# Patient Record
Sex: MALE | Race: WHITE | NOT HISPANIC OR LATINO | Employment: FULL TIME | URBAN - METROPOLITAN AREA
[De-identification: names, ages, dates, MRNs, and addresses within clinical notes are randomized per-mention and may not be internally consistent; named-entity substitution may affect disease eponyms.]

---

## 2021-03-02 ENCOUNTER — OFFICE VISIT (OUTPATIENT)
Dept: DERMATOLOGY | Facility: CLINIC | Age: 61
End: 2021-03-02
Payer: COMMERCIAL

## 2021-03-02 VITALS — WEIGHT: 207.8 LBS | HEIGHT: 70 IN | TEMPERATURE: 96.5 F | BODY MASS INDEX: 29.75 KG/M2

## 2021-03-02 DIAGNOSIS — D22.60 MULTIPLE BENIGN MELANOCYTIC NEVI OF UPPER AND LOWER EXTREMITIES AND TRUNK: ICD-10-CM

## 2021-03-02 DIAGNOSIS — D22.5 MULTIPLE BENIGN MELANOCYTIC NEVI OF UPPER AND LOWER EXTREMITIES AND TRUNK: ICD-10-CM

## 2021-03-02 DIAGNOSIS — L73.8 SEBACEOUS HYPERPLASIA OF FACE: ICD-10-CM

## 2021-03-02 DIAGNOSIS — L57.8 ACTINIC SKIN DAMAGE: ICD-10-CM

## 2021-03-02 DIAGNOSIS — D23.9 DERMATOFIBROMA: ICD-10-CM

## 2021-03-02 DIAGNOSIS — D22.70 MULTIPLE BENIGN MELANOCYTIC NEVI OF UPPER AND LOWER EXTREMITIES AND TRUNK: ICD-10-CM

## 2021-03-02 DIAGNOSIS — L82.1 SEBORRHEIC KERATOSIS: Primary | ICD-10-CM

## 2021-03-02 PROCEDURE — 99204 OFFICE O/P NEW MOD 45 MIN: CPT | Performed by: DERMATOLOGY

## 2021-03-02 RX ORDER — FLUTICASONE PROPIONATE 50 MCG
SPRAY, SUSPENSION (ML) NASAL
COMMUNITY
Start: 2021-02-27

## 2021-03-02 RX ORDER — DICYCLOMINE HYDROCHLORIDE 10 MG/1
10 CAPSULE ORAL
COMMUNITY
Start: 2020-03-06 | End: 2021-03-06

## 2021-03-02 RX ORDER — ECHINACEA 400 MG
CAPSULE ORAL
COMMUNITY

## 2021-03-02 RX ORDER — VERAPAMIL HYDROCHLORIDE 240 MG/1
CAPSULE, EXTENDED RELEASE ORAL
COMMUNITY
Start: 2021-02-15

## 2021-03-02 RX ORDER — FEXOFENADINE HCL 180 MG/1
TABLET ORAL
COMMUNITY

## 2021-03-02 NOTE — PROGRESS NOTES
[facility] Note     Patient Name: Celina Rosenberg  Encounter Date: 3/2/2021     Have you been cared for by a [facility] in the last 3 years and, if so, which one? No    · Have you traveled outside of the [address] [place] [address] in the past 3 months or outside of the [facility] area in the last 2 weeks? No     May we call your Preferred Phone number to discuss your specific medical information? Yes     May we leave a detailed message that includes your specific medical information? Yes      Today's Chief Concerns:   Concern #1:  Skin lesions on chest and waist area   Concern #2:  Full body skin check     Past Medical History:  Have you personally ever had or currently have any of the following? · Skin cancer (such as Melanoma, Basal Cell Carcinoma, Squamous Cell Carcinoma? (If Yes, please provide more detail)- No  · Eczema: No  · Psoriasis: No  · HIV/AIDS: No  · Hepatitis B or C: No  · Tuberculosis: No  · Systemic Immunosuppression such as Diabetes, Biologic or Immunotherapy, Chemotherapy, Organ Transplantation, Bone Marrow Transplantation (If YES, please provide more detail): No  · Radiation Treatment (If YES, please provide more detail): No  · Any other major medical conditions/concerns? (If Yes, which types)- YES, Hisotry of hypertension     Social History:     What is/was your primary occupation?       What are your hobbies/past-times? Reading     Family History:  Have any of your "first degree relatives" (parent, brother, sister, or child) had any of the following       · Skin cancer such as Melanoma or Merkel Cell Carcinoma or Pancreatic Cancer? No  · Eczema, Asthma, Hay Fever or Seasonal Allergies: No  · Psoriasis or Psoriatic Arthritis: No  · Do any other medical conditions seem to run in your family? If Yes, what condition and which relatives?   No    Current Medications:         Current Outpatient Medications:     dicyclomine (BENTYL) 10 mg capsule, Take 10 mg by mouth, Disp: , Rfl:     fexofenadine (ALLEGRA) 180 MG tablet, Take by mouth, Disp: , Rfl:     Flaxseed, Linseed, (Flaxseed Oil) 1000 MG CAPS, Take by mouth, Disp: , Rfl:     fluticasone (FLONASE) 50 mcg/act nasal spray, , Disp: , Rfl:     verapamil (VERELAN) 240 MG 24 hr capsule, TAKE 1 CAPSULE BY MOUTH EVERY NIGHT, Disp: , Rfl:       Review of Systems:  Have you recently had or currently have any of the following? If YES, what are you doing for the problem? · Fever, chills or unintended weight loss: No  · Sudden loss or change in your vision: No  · Nausea, vomiting or blood in your stool: No  · Painful or swollen joints: No  · Wheezing or cough: No  · Changing mole or non-healing wound: No  · Nosebleeds: No  · Excessive sweating: No  · Easy or prolonged bleeding? No  · Over the last 2 weeks, how often have you been bothered by the following problems? · Taking little interest or pleasure in doing things: 1 - Not at All  · Feeling down, depressed, or hopeless: 1 - Not at All  · Rapid heartbeat with epinephrine:  No    · FEMALES ONLY:    · Are you pregnant or planning to become pregnant? N/A  · Are you currently or planning to be nursing or breast feeding? N/A    · Any known allergies? Allergies   Allergen Reactions    Shellfish-Derived Products (Food Allergy) Swelling     Hives and severe swelling         Physical Exam:     Was a chaperone (Derm Clinical Assistant) present throughout the entire Physical Exam? Yes     Did the Dermatology Team specifically  the patient on the importance of a Full Skin Exam to be sure that nothing is missed clinically?  Yes}  o Did the patient ultimately request or accept a Full Skin Exam?  Yes  o Did the patient specifically refuse to have the areas "under-the-bra" examined by the Dermatologist? No  o Did the patient specifically refuse to have the areas "under-the-underwear" examined by the Dermatologist? No    CONSTITUTIONAL:   Vitals:    03/02/21 1052   Temp: (!) 96 5 °F (35 8 °C)   TempSrc: Tympanic   Weight: 94 3 kg (207 lb 12 8 oz)   Height: 5' 10" (1 778 m)       PSYCH: Normal mood and affect  EYES: Normal conjunctiva  ENT: Normal lips and oral mucosa  CARDIOVASCULAR: No edema  RESPIRATORY: Normal respirations  HEME/LYMPH/IMMUNO:  No regional lymphadenopathy except as noted below in "ASSESSMENT AND PLAN BY DIAGNOSIS"    SKIN:  FULL ORGAN SYSTEM EXAM   Hair, Scalp, Ears, Face Normal except as noted below in Assessment   Neck, Cervical Chain Nodes Normal except as noted below in Assessment   Right Arm/Hand/Fingers Normal except as noted below in Assessment   Left Arm/Hand/Fingers Normal except as noted below in Assessment   Chest/Breasts/Axillae Viewed areas Normal except as noted below in Assessment   Abdomen, Umbilicus Normal except as noted below in Assessment   Back/Spine Normal except as noted below in Assessment   Groin/Genitalia/Buttocks    Right Leg, Foot, Toes Normal except as noted below in Assessment   Left Leg, Foot, Toes Normal except as noted below in Assessment        Assessment and Plan by Diagnosis:    History of Present Condition:     Duration:  How long has this been an issue for you?    o  A year    Location Affected:  Where on the body is this affecting you?    o  Chest and waist area   Quality:  Is there any bleeding, pain, itch, burning/irritation, or redness associated with the skin lesion?    o  Denies   Severity:  Describe any bleeding, pain, itch, burning/irritation, or redness on a scale of 1 to 10 (with 10 being the worst)  o  denies   Timing:  Does this condition seem to be there pretty constantly or do you notice it more at specific times throughout the day?    o  Denies    Context:  Have you ever noticed that this condition seems to be associated with specific activities you do?    o  Denies    Modifying Factors:    o Anything that seems to make the condition worse?     - Denies  o What have you tried to do to make the condition better? -  Denies   Associated Signs and Symptoms:  Does this skin lesion seem to be associated with any of the following:  o  Denies     1  SEBORRHEIC KERATOSIS; NON-INFLAMED    Physical Exam:   Anatomic Location Affected:  Chest, trunk    Morphological Description:  Flat and raised, waxy, smooth to warty textured, yellow to brownish-grey to dark brown to blackish, discrete, "stuck-on" appearing papules   Pertinent Positives:   Pertinent Negatives: Additional History of Present Condition:  Patient reports new bumps on the skin  Denies itch, burn, pain, bleeding or ulceration  Present constantly; nothing seems to make it worse or better  No prior treatment  Assessment and Plan:  Based on a thorough discussion of this condition and the management approach to it (including a comprehensive discussion of the known risks, side effects and potential benefits of treatment), the patient (family) agrees to implement the following specific plan:   Reassured benign   When outside we recommend using a wide brim hat, sunglasses, long sleeve and pants, sunscreen with SPF 58+ with reapplication every 2 hours, or SPF specific clothing      Seborrheic Keratosis  A seborrheic keratosis is a harmless warty spot that appears during adult life as a common sign of skin aging  Seborrheic keratoses can arise on any area of skin, covered or uncovered, with the usual exception of the palms and soles  They do not arise from mucous membranes  Seborrheic keratoses can have highly variable appearance  Seborrheic keratoses are extremely common  It has been estimated that over 90% of adults over the age of 61 years have one or more of them  They occur in males and females of all races, typically beginning to erupt in the 35s or 45s  They are uncommon under the age of 21 years  The precise cause of seborrhoeic keratoses is not known    Seborrhoeic keratoses are considered degenerative in nature  As time goes by, seborrheic keratoses tend to become more numerous  Some people inherit a tendency to develop a very large number of them; some people may have hundreds of them  The name "seborrheic keratosis" is misleading, because these lesions are not limited to a seborrhoeic distribution (scalp, mid-face, chest, upper back), nor are they formed from sebaceous glands, nor are they associated with sebum -- which is greasy  Seborrheic keratosis may also be called "SK," "Seb K," "basal cell papilloma," "senile wart," or "barnacle "      Researchers have noted:   Eruptive seborrhoeic keratoses can follow sunburn or dermatitis   Skin friction may be the reason they appear in body folds   Viral cause (e g , human papillomavirus) seems unlikely   Stable and clonal mutations or activation of FRFR3, PIK3CA, EDDY, AKT1 and EGFR genes are found in seborrhoeic keratoses   Seborrhoeic keratosis can arise from solar lentigo   FRFR3 mutations also arise in solar lentigines  These mutations are associated with increased age and location on the head and neck, suggesting a role of ultraviolet radiation in these lesions   Seborrheic keratoses do not harbour tumour suppressor gene mutations   Epidermal growth factor receptor inhibitors, which are used to treat some cancers, often result in an increase in verrucal (warty) keratoses  There is no easy way to remove multiple lesions on a single occasion  Unless a specific lesion is "inflamed" and is causing pain or stinging/burning or is bleeding, most insurance companies do not authorize treatment        2  DERMATOFIBROMA    Physical Exam:   Anatomic Location Affected:  Left thigh    Morphological Description:  Firm dermal nodule 0 6cm   Pertinent Positives:   Pertinent Negatives:      Assessment and Plan:  Based on a thorough discussion of this condition and the management approach to it (including a comprehensive discussion of the known risks, side effects and potential benefits of treatment), the patient (family) agrees to implement the following specific plan:   Reassured benign     Assessment and Plan:  A dermatofibroma is a common benign fibrous nodule that most often arises on the skin of the lower legs  A dermatofibroma is also called a "cutaneous fibrous histiocytoma "  Dermatofibromas occur at all ages and in people of every ethnicity  They are more common in women than in men  It is not clear if dermatofibroma is a reactive process or if it is a neoplasm  The lesions are made up of proliferating fibroblasts  Histiocytes may also be involved  They are sometimes attributed to an insect bite or ingrownhair or local trauma, but not consistently  They may be more numerous in patients with altered immunity  Dermatofibromas most often occur on the legs and arms, but may also arise on the trunk or any site of the body  Typical clinical features include the following:   People may have 1 or up to 15 lesions   Size varies from 0 5-1 5 cm diameter; most lesions are 7-10 mm diameter   They are firm nodules tethered to the skin surface and mobile over subcutaneous tissue   The skin "dimples" on pinching the lesion   Color may be pink to light brown in white skin, and dark brown to black in dark skin; some appear paler in the center   They do not usually cause symptoms, but they are sometimes painful or itchy   Because they are often raised lesions, they may be traumatized, for example by a razor   Occasionally dozens may erupt within a few months, usually in the setting of immunosuppression (for example autoimmune disease, cancer or certain medications)   Dermatofibroma does not give rise to cancer  However, occasionally, it may be mistaken for dermatofibrosarcoma or desmoplastic melanoma  A dermatofibroma is harmless and seldom causes any symptoms  Usually, only reassurance is needed   If it is nuisance or causing concern, the lesion can be removed surgically, resulting in a scar that is, by definition, usually longer in diameter than the widest portion of the dermatofibroma  Cryotherapy, shave biopsy and laser surgery are rarely completely successful  Skin punch biopsy or incisional biopsy may be undertaken if there is an atypical feature such as recent enlargement, ulceration, or asymmetrical structures and colours on dermatoscopy  3  SEBACEOUS HYPERPLASIA    Physical Exam:   Anatomic Location Affected:  Face, cheek    Morphological Description:  Yellow umbilicated patpules   Pertinent Positives:   Pertinent Negatives:      Assessment and Plan:  Based on a thorough discussion of this condition and the management approach to it (including a comprehensive discussion of the known risks, side effects and potential benefits of treatment), the patient (family) agrees to implement the following specific plan:   Reassured benign    Recommended to apply a cream with Retinol over the counter   Discussed laser or chemical peels     Sebaceous Hyperplasia  Sebaceous hyperplasia is a common, benign condition of enlarged oil secreting (sebaceous) glands commonly found on the forehead and cheeks of middle-aged and elderly patients  They normally appear as small yellow bumps up to 3mm in diameter that can be single or multiple  The bumps on the face often display a centrall dell  Occasionally, these bumps can occur on the chest, areola, mouth, and genitals  Rarely, they can grow to take a giant form, or be arranged linearly  Causes of sebaceous hyperplasia  Sebaceous hyperplasic is a form of benign hair follicle tumor and can often be confused with basal cell carcinoma  It can be more prevalent in immunosuppressed patients such as those undergoing organ transplantation  In the rare Acosta-Reinbeck syndrome, sebaceous hyperplasia occurs in association with internal cancers    Lesions of sebaceous hyperplasia are benign, with no known potential for malignant transformation, but they may be associated with nonmelanoma skin cancer in transplantation patients  How we do diagnose sebaceous hyperplasia? Your dermatologist may take a closer look at the bumps with a device called a dermatoscope  Common features include a central hair follicle surrounded by yellowish lobules with prominent blood vessels  What is the treatment of sebaceous hyperplasia? Since sebaceous hyperplasia is benign with no known potential for transformation into cancer, treatment is mostly for cosmetic reasons or if the lesions become irritated  Options include   - Light electrocautery or laser vaporization  - Oral isotrentinoin is effective for extensive or disfiguring spots, but do not prevent recurrence   - Antiandrogens may be used in females to decrease the size and improve overall appearance of bumps     4  ACTINIC DAMAGE (Chronic Ultraviolet Radiation Exposure)    Physical Exam:   Anatomic Location Affected: Face   Morphological Description:  Mottled (hyper- and hypo-pigmented), slightly atrophic skin with overlying telangiectasia   Pertinent Positives:   Pertinent Negatives:      Assessment and Plan:  Based on a thorough discussion of this condition and the management approach to it (including a comprehensive discussion of the known risks, side effects and potential benefits of treatment), the patient (family) agrees to implement the following specific plan:   Reassured benign   When outside we recommend using a wide brim hat, sunglasses, long sleeve and pants, sunscreen with SPF 84+ with reapplication every 2 hours, or SPF specific clothing       Photo-aging and actinic damage of skin is common on sites repeatedly exposed to the sun, especially the backs of the hands and the face, most often affecting the ears, nose, cheeks, upper lip, vermilion of the lower lip, temples, forehead and balding scalp   In severely chronically sun-exposed individuals, this condition may also be found on the upper trunk, upper and lower limbs, and dorsum of feet  Photo-aging induces cutaneous changes that vary among individuals, reflecting inherent differences in vulnerability to sun exposure and repair capacity  We discussed further steps to minimize or avoid UV exposure:     Be aware of daily UV index levels  In the [facility], this index is often reported on the [address]   Avoid outdoor activities during the middle of the day   Wear sun-protective clothing (e g , UPF-rated, broad-brimmed hats, long sleeves, and trousers or skirts)   Apply a high sun protection factor (60+) broad-spectrum sunscreen moisturizer at least three times a day to affected areas, year-round  I recommended Neutrogena Daily Defense or CeraVe AM or Aveeno   Do not smoke, and where possible, avoid exposure to pollutants   Get plenty of exercise -- active people appear younger than inactive people   Eat fruit and vegetables daily   Many oral supplements with antioxidant and anti-inflammatory properties have been advocated to mitigate skin aging and to improve skin health  These include carotenoids; polyphenols; chlorophyll; aloe vera; vitamins B, C, and E; red ginseng; squalene; and omega-3 fatty acids  Their role in combatting skin aging is unclear  MELANOCYTIC NEVI ("Moles")    Physical Exam:   Anatomic Location Affected:   Trunk and extremities    Morphological Description:  Scattered, 1-4mm round to ovoid, symmetrical-appearing, even bordered, skin colored to dark brown macules/papules, mostly in sun-exposed areas   Pertinent Positives:   Pertinent Negatives: Additional History of Present Condition:  No historical changes noted      Assessment and Plan:  Based on a thorough discussion of this condition and the management approach to it (including a comprehensive discussion of the known risks, side effects and potential benefits of treatment), the patient (family) agrees to implement the following specific plan:   Reassured benign   When outside we recommend using a wide brim hat, sunglasses, long sleeve and pants, sunscreen with SPF 90+ with reapplication every 2 hours, or SPF specific clothing   Monitor for changes  Melanocytic Nevi  Melanocytic nevi ("moles") are tan or brown, raised or flat areas of the skin which have an increased number of melanocytes  Melanocytes are the cells in our body which make pigment and account for skin color  Some moles are present at birth (I e , "congenital nevi"), while others come up later in life (i e , "acquired nevi")  The sun can stimulate the body to make more moles  Sunburns are not the only thing that triggers more moles  Chronic sun exposure can do it too  Clinically distinguishing a healthy mole from melanoma may be difficult, even for experienced dermatologists  The "ABCDE's" of moles have been suggested as a means of helping to alert a person to a suspicious mole and the possible increased risk of melanoma  The suggestions for raising alert are as follows:    Asymmetry: Healthy moles tend to be symmetric, while melanomas are often asymmetric  Asymmetry means if you draw a line through the mole, the two halves do not match in color, size, shape, or surface texture  Asymmetry can be a result of rapid enlargement of a mole, the development of a raised area on a previously flat lesion, scaling, ulceration, bleeding or scabbing within the mole  Any mole that starts to demonstrate "asymmetry" should be examined promptly by a board certified dermatologist      Border: Healthy moles tend to have discrete, even borders  The border of a melanoma often blends into the normal skin and does not sharply delineate the mole from normal skin    Any mole that starts to demonstrate "uneven borders" should be examined promptly by a board certified dermatologist      Color: Healthy moles tend to be one color throughout  Melanomas tend to be made up of different colors ranging from dark black, blue, white, or red  Any mole that demonstrates a color change should be examined promptly by a board certified dermatologist      Diameter: Healthy moles tend to be smaller than 0 6 cm in size; an exception are "congenital nevi" that can be larger  Melanomas tend to grow and can often be greater than 0 6 cm (1/4 of an inch, or the size of a pencil eraser)  This is only a guideline, and many normal moles may be larger than 0 6 cm without being unhealthy  Any mole that starts to change in size (small to bigger or bigger to smaller) should be examined promptly by a board certified dermatologist      Evolving: Healthy moles tend to "stay the same "  Melanomas may often show signs of change or evolution such as a change in size, shape, color, or elevation  Any mole that starts to itch, bleed, crust, burn, hurt, or ulcerate or demonstrate a change or evolution should be examined promptly by a board certified dermatologist       Dysplastic Nevi  Dysplastic moles are moles that fit the ABCDE rules of melanoma but are not identified as melanomas when examined under the microscope  They may indicate an increased risk of melanoma in that person  If there is a family history of melanoma, most experts agree that the person may be at an increased risk for developing a melanoma  Experts still do not agree on what dysplastic moles mean in patients without a personal or family history of melanoma  Dysplastic moles are usually larger than common moles and have different colors within it with irregular borders  The appearance can be very similar to a melanoma  Biopsies of dysplastic moles may show abnormalities which are different from a regular mole  Melanoma  Malignant melanoma is a type of skin cancer that can be deadly if it spreads throughout the body   The incidence of melanoma in the [place] is growing faster than any other cancer  Melanoma usually grows near the surface of the skin for a period of time, and then begins to grow deeper into the skin  Once it grows deeper into the skin, the risk of spread to other organs greatly increases  Therefore, early detection and removal of a malignant melanoma may result in a better chance at a complete cure; removal after the tumor has spread may not be as effective, leading to worse clinical outcomes such as death  The true rate of nevus transformation into a melanoma is unknown  It has been estimated that the lifetime risk for any acquired melanocytic nevus on any 21year-old individual transforming into melanoma by age [de-identified] is 0 03% (1 in 3,164) for men and 0 009% (1 in 10,800) for women  The appearance of a "new mole" remains one of the most reliable methods for identifying a malignant melanoma  Occasionally, melanomas appear as rapidly growing, blue-black, dome-shaped bumps within a previous mole or previous area of normal skin  Other times, melanomas are suspected when a mole suddenly appears or changes  Itching, burning, or pain in a pigmented lesion should increase suspicion, but most patients with early melanoma have no skin discomfort whatsoever  Melanoma can occur anywhere on the skin, including areas that are difficult for self-examination  Many melanomas are first noticed by other family members  Suspicious-looking moles may be removed for microscopic examination  You may be able to prevent death from melanoma by doing two simple things:    1  Try to avoid unnecessary sun exposure and protect your skin when it is exposed to the sun  People who live near the equator, people who have intermittent exposures to large amounts of sun, and people who have had sunburns in childhood or adolescence have an increased risk for melanoma  Sun sense and vigilant sun protection may be keys to helping to prevent melanoma    We recommend wearing UPF-rated sun protective clothing and sunglasses whenever possible and applying a moisturizer-sunscreen combination product (SPF 50+) such as Neutrogena Daily Defense to sun exposed areas of skin at least three times a day  2  Have your moles regularly examined by a board certified dermatologist AND by yourself or a family member/friend at home  We recommend that you have your moles examined at least once a year by a board certified dermatologist   Use your birthday as an annual reminder to have your "Birthday Suit" (I e , your skin) examined; it is a nice birthday gift to yourself to know that your skin is healthy appearing! Additionally, at-home self examinations may be helpful for detecting a possible melanoma  Use the ABCDEs we discussed and check your moles once a month at home        Scribe Attestation    I,:  Leticia Karimi am acting as a scribe while in the presence of the attending physician :       I,:  Katerin Dasilva MD personally performed the services described in this documentation    as scribed in my presence :

## 2021-03-02 NOTE — PATIENT INSTRUCTIONS
SEBORRHEIC KERATOSIS; NON-INFLAMED    Assessment and Plan:  Based on a thorough discussion of this condition and the management approach to it (including a comprehensive discussion of the known risks, side effects and potential benefits of treatment), the patient (family) agrees to implement the following specific plan:   Reassured benign   When outside we recommend using a wide brim hat, sunglasses, long sleeve and pants, sunscreen with SPF 66+ with reapplication every 2 hours, or SPF specific clothing      Seborrheic Keratosis  A seborrheic keratosis is a harmless warty spot that appears during adult life as a common sign of skin aging  Seborrheic keratoses can arise on any area of skin, covered or uncovered, with the usual exception of the palms and soles  They do not arise from mucous membranes  Seborrheic keratoses can have highly variable appearance  Seborrheic keratoses are extremely common  It has been estimated that over 90% of adults over the age of 61 years have one or more of them  They occur in males and females of all races, typically beginning to erupt in the 35s or 45s  They are uncommon under the age of 21 years  The precise cause of seborrhoeic keratoses is not known  Seborrhoeic keratoses are considered degenerative in nature  As time goes by, seborrheic keratoses tend to become more numerous  Some people inherit a tendency to develop a very large number of them; some people may have hundreds of them  The name "seborrheic keratosis" is misleading, because these lesions are not limited to a seborrhoeic distribution (scalp, mid-face, chest, upper back), nor are they formed from sebaceous glands, nor are they associated with sebum -- which is greasy    Seborrheic keratosis may also be called "SK," "Seb K," "basal cell papilloma," "senile wart," or "barnacle "      Researchers have noted:   Eruptive seborrhoeic keratoses can follow sunburn or dermatitis   Skin friction may be the reason they appear in body folds   Viral cause (e g , human papillomavirus) seems unlikely   Stable and clonal mutations or activation of FRFR3, PIK3CA, EDDY, AKT1 and EGFR genes are found in seborrhoeic keratoses   Seborrhoeic keratosis can arise from solar lentigo   FRFR3 mutations also arise in solar lentigines  These mutations are associated with increased age and location on the head and neck, suggesting a role of ultraviolet radiation in these lesions   Seborrheic keratoses do not harbour tumour suppressor gene mutations   Epidermal growth factor receptor inhibitors, which are used to treat some cancers, often result in an increase in verrucal (warty) keratoses  There is no easy way to remove multiple lesions on a single occasion  Unless a specific lesion is "inflamed" and is causing pain or stinging/burning or is bleeding, most insurance companies do not authorize treatment  2  DERMATOFIBROMA      Assessment and Plan:  Based on a thorough discussion of this condition and the management approach to it (including a comprehensive discussion of the known risks, side effects and potential benefits of treatment), the patient (family) agrees to implement the following specific plan:   Reassured benign     Assessment and Plan:  A dermatofibroma is a common benign fibrous nodule that most often arises on the skin of the lower legs  A dermatofibroma is also called a "cutaneous fibrous histiocytoma "  Dermatofibromas occur at all ages and in people of every ethnicity  They are more common in women than in men  It is not clear if dermatofibroma is a reactive process or if it is a neoplasm  The lesions are made up of proliferating fibroblasts  Histiocytes may also be involved  They are sometimes attributed to an insect bite or ingrownhair or local trauma, but not consistently  They may be more numerous in patients with altered immunity      Dermatofibromas most often occur on the legs and arms, but may also arise on the trunk or any site of the body  Typical clinical features include the following:   People may have 1 or up to 15 lesions   Size varies from 0 5-1 5 cm diameter; most lesions are 7-10 mm diameter   They are firm nodules tethered to the skin surface and mobile over subcutaneous tissue   The skin "dimples" on pinching the lesion   Color may be pink to light brown in white skin, and dark brown to black in dark skin; some appear paler in the center   They do not usually cause symptoms, but they are sometimes painful or itchy   Because they are often raised lesions, they may be traumatized, for example by a razor   Occasionally dozens may erupt within a few months, usually in the setting of immunosuppression (for example autoimmune disease, cancer or certain medications)   Dermatofibroma does not give rise to cancer  However, occasionally, it may be mistaken for dermatofibrosarcoma or desmoplastic melanoma  A dermatofibroma is harmless and seldom causes any symptoms  Usually, only reassurance is needed  If it is nuisance or causing concern, the lesion can be removed surgically, resulting in a scar that is, by definition, usually longer in diameter than the widest portion of the dermatofibroma  Cryotherapy, shave biopsy and laser surgery are rarely completely successful  Skin punch biopsy or incisional biopsy may be undertaken if there is an atypical feature such as recent enlargement, ulceration, or asymmetrical structures and colours on dermatoscopy  3  SEBACEOUS HYPERPLASIA      Assessment and Plan:  Based on a thorough discussion of this condition and the management approach to it (including a comprehensive discussion of the known risks, side effects and potential benefits of treatment), the patient (family) agrees to implement the following specific plan:   Reassured benign    Recommended to apply a cream with Retinol over the counter     Discussed laser or chemical peels Sebaceous Hyperplasia  Sebaceous hyperplasia is a common, benign condition of enlarged oil secreting (sebaceous) glands commonly found on the forehead and cheeks of middle-aged and elderly patients  They normally appear as small yellow bumps up to 3mm in diameter that can be single or multiple  The bumps on the face often display a centrall dell  Occasionally, these bumps can occur on the chest, areola, mouth, and genitals  Rarely, they can grow to take a giant form, or be arranged linearly  Causes of sebaceous hyperplasia  Sebaceous hyperplasic is a form of benign hair follicle tumor and can often be confused with basal cell carcinoma  It can be more prevalent in immunosuppressed patients such as those undergoing organ transplantation  In the rare Acosta-Milton syndrome, sebaceous hyperplasia occurs in association with internal cancers  Lesions of sebaceous hyperplasia are benign, with no known potential for malignant transformation, but they may be associated with nonmelanoma skin cancer in transplantation patients  How we do diagnose sebaceous hyperplasia? Your dermatologist may take a closer look at the bumps with a device called a dermatoscope  Common features include a central hair follicle surrounded by yellowish lobules with prominent blood vessels  What is the treatment of sebaceous hyperplasia? Since sebaceous hyperplasia is benign with no known potential for transformation into cancer, treatment is mostly for cosmetic reasons or if the lesions become irritated  Options include   - Light electrocautery or laser vaporization  - Oral isotrentinoin is effective for extensive or disfiguring spots, but do not prevent recurrence   - Antiandrogens may be used in females to decrease the size and improve overall appearance of bumps     4   ACTINIC DAMAGE (Chronic Ultraviolet Radiation Exposure)      Assessment and Plan:  Based on a thorough discussion of this condition and the management approach to it (including a comprehensive discussion of the known risks, side effects and potential benefits of treatment), the patient (family) agrees to implement the following specific plan:   Reassured benign   When outside we recommend using a wide brim hat, sunglasses, long sleeve and pants, sunscreen with SPF 10+ with reapplication every 2 hours, or SPF specific clothing       Photo-aging and actinic damage of skin is common on sites repeatedly exposed to the sun, especially the backs of the hands and the face, most often affecting the ears, nose, cheeks, upper lip, vermilion of the lower lip, temples, forehead and balding scalp  In severely chronically sun-exposed individuals, this condition may also be found on the upper trunk, upper and lower limbs, and dorsum of feet  Photo-aging induces cutaneous changes that vary among individuals, reflecting inherent differences in vulnerability to sun exposure and repair capacity  We discussed further steps to minimize or avoid UV exposure:     Be aware of daily UV index levels  In the [facility], this index is often reported on the [address]   Avoid outdoor activities during the middle of the day   Wear sun-protective clothing (e g , UPF-rated, broad-brimmed hats, long sleeves, and trousers or skirts)   Apply a high sun protection factor (60+) broad-spectrum sunscreen moisturizer at least three times a day to affected areas, year-round  I recommended Neutrogena Daily Defense or CeraVe AM or Aveeno   Do not smoke, and where possible, avoid exposure to pollutants   Get plenty of exercise -- active people appear younger than inactive people   Eat fruit and vegetables daily   Many oral supplements with antioxidant and anti-inflammatory properties have been advocated to mitigate skin aging and to improve skin health   These include carotenoids; polyphenols; chlorophyll; aloe vera; vitamins B, C, and E; red ginseng; squalene; and omega-3 fatty acids  Their role in combatting skin aging is unclear  MELANOCYTIC NEVI ("Moles")    Assessment and Plan:  Based on a thorough discussion of this condition and the management approach to it (including a comprehensive discussion of the known risks, side effects and potential benefits of treatment), the patient (family) agrees to implement the following specific plan:   Reassured benign   When outside we recommend using a wide brim hat, sunglasses, long sleeve and pants, sunscreen with SPF 09+ with reapplication every 2 hours, or SPF specific clothing   Monitor for changes       Melanocytic Nevi  Melanocytic nevi ("moles") are tan or brown, raised or flat areas of the skin which have an increased number of melanocytes  Melanocytes are the cells in our body which make pigment and account for skin color  Some moles are present at birth (I e , "congenital nevi"), while others come up later in life (i e , "acquired nevi")  The sun can stimulate the body to make more moles  Sunburns are not the only thing that triggers more moles  Chronic sun exposure can do it too  Clinically distinguishing a healthy mole from melanoma may be difficult, even for experienced dermatologists  The "ABCDE's" of moles have been suggested as a means of helping to alert a person to a suspicious mole and the possible increased risk of melanoma  The suggestions for raising alert are as follows:    Asymmetry: Healthy moles tend to be symmetric, while melanomas are often asymmetric  Asymmetry means if you draw a line through the mole, the two halves do not match in color, size, shape, or surface texture  Asymmetry can be a result of rapid enlargement of a mole, the development of a raised area on a previously flat lesion, scaling, ulceration, bleeding or scabbing within the mole    Any mole that starts to demonstrate "asymmetry" should be examined promptly by a board certified dermatologist      Border: Healthy moles tend to have discrete, even borders  The border of a melanoma often blends into the normal skin and does not sharply delineate the mole from normal skin  Any mole that starts to demonstrate "uneven borders" should be examined promptly by a board certified dermatologist      Color: Healthy moles tend to be one color throughout  Melanomas tend to be made up of different colors ranging from dark black, blue, white, or red  Any mole that demonstrates a color change should be examined promptly by a board certified dermatologist      Diameter: Healthy moles tend to be smaller than 0 6 cm in size; an exception are "congenital nevi" that can be larger  Melanomas tend to grow and can often be greater than 0 6 cm (1/4 of an inch, or the size of a pencil eraser)  This is only a guideline, and many normal moles may be larger than 0 6 cm without being unhealthy  Any mole that starts to change in size (small to bigger or bigger to smaller) should be examined promptly by a board certified dermatologist      Evolving: Healthy moles tend to "stay the same "  Melanomas may often show signs of change or evolution such as a change in size, shape, color, or elevation  Any mole that starts to itch, bleed, crust, burn, hurt, or ulcerate or demonstrate a change or evolution should be examined promptly by a board certified dermatologist       Dysplastic Nevi  Dysplastic moles are moles that fit the ABCDE rules of melanoma but are not identified as melanomas when examined under the microscope  They may indicate an increased risk of melanoma in that person  If there is a family history of melanoma, most experts agree that the person may be at an increased risk for developing a melanoma  Experts still do not agree on what dysplastic moles mean in patients without a personal or family history of melanoma  Dysplastic moles are usually larger than common moles and have different colors within it with irregular borders   The appearance can be very similar to a melanoma  Biopsies of dysplastic moles may show abnormalities which are different from a regular mole  Melanoma  Malignant melanoma is a type of skin cancer that can be deadly if it spreads throughout the body  The incidence of melanoma in the [place] is growing faster than any other cancer  Melanoma usually grows near the surface of the skin for a period of time, and then begins to grow deeper into the skin  Once it grows deeper into the skin, the risk of spread to other organs greatly increases  Therefore, early detection and removal of a malignant melanoma may result in a better chance at a complete cure; removal after the tumor has spread may not be as effective, leading to worse clinical outcomes such as death  The true rate of nevus transformation into a melanoma is unknown  It has been estimated that the lifetime risk for any acquired melanocytic nevus on any 21year-old individual transforming into melanoma by age [de-identified] is 0 03% (1 in 3,164) for men and 0 009% (1 in 10,800) for women  The appearance of a "new mole" remains one of the most reliable methods for identifying a malignant melanoma  Occasionally, melanomas appear as rapidly growing, blue-black, dome-shaped bumps within a previous mole or previous area of normal skin  Other times, melanomas are suspected when a mole suddenly appears or changes  Itching, burning, or pain in a pigmented lesion should increase suspicion, but most patients with early melanoma have no skin discomfort whatsoever  Melanoma can occur anywhere on the skin, including areas that are difficult for self-examination  Many melanomas are first noticed by other family members  Suspicious-looking moles may be removed for microscopic examination  You may be able to prevent death from melanoma by doing two simple things:    1  Try to avoid unnecessary sun exposure and protect your skin when it is exposed to the sun    People who live near the equator, people who have intermittent exposures to large amounts of sun, and people who have had sunburns in childhood or adolescence have an increased risk for melanoma  Sun sense and vigilant sun protection may be keys to helping to prevent melanoma  We recommend wearing UPF-rated sun protective clothing and sunglasses whenever possible and applying a moisturizer-sunscreen combination product (SPF 50+) such as Neutrogena Daily Defense to sun exposed areas of skin at least three times a day  2  Have your moles regularly examined by a board certified dermatologist AND by yourself or a family member/friend at home  We recommend that you have your moles examined at least once a year by a board certified dermatologist   Use your birthday as an annual reminder to have your "Birthday Suit" (I e , your skin) examined; it is a nice birthday gift to yourself to know that your skin is healthy appearing! Additionally, at-home self examinations may be helpful for detecting a possible melanoma  Use the ABCDEs we discussed and check your moles once a month at home

## 2022-12-21 ENCOUNTER — OFFICE VISIT (OUTPATIENT)
Dept: DERMATOLOGY | Facility: CLINIC | Age: 62
End: 2022-12-21

## 2022-12-21 VITALS — HEIGHT: 70 IN | WEIGHT: 223 LBS | BODY MASS INDEX: 31.92 KG/M2

## 2022-12-21 DIAGNOSIS — D48.5 NEOPLASM OF UNCERTAIN BEHAVIOR OF SKIN: ICD-10-CM

## 2022-12-21 DIAGNOSIS — L82.1 SEBORRHEIC KERATOSIS: Primary | ICD-10-CM

## 2022-12-21 RX ORDER — TAMSULOSIN HYDROCHLORIDE 0.4 MG/1
0.4 CAPSULE ORAL DAILY
COMMUNITY
Start: 2022-12-14

## 2022-12-21 NOTE — PROGRESS NOTES
[facility] [facility] [address] [facility] Note     Patient Name: Alejandro Kasper  Encounter Date: 12/21/22     Have you been cared for by a [place] [address] Dermatologist in the last 3 years and, if so, which description applies to you? Yes  I have been here within the last 3 years, and my medical history has NOT changed since that time  I am MALE/not capable of bearing children  REVIEW OF SYSTEMS:  Have you recently had or currently have any of the following? · No changes in my recent health  PAST MEDICAL HISTORY:  Have you personally ever had or currently have any of the following? If "YES," then please provide more detail  · No changes in my medical history  FAMILY HISTORY:  Any "first degree relatives" (parent, brother, sister, or child) with the following? • No changes in my family's known health  PATIENT EXPERIENCE:    • Do you want the Dermatologist to perform a COMPLETE skin exam today including a clinical examination under the "bra and underwear" areas? Yes  • If necessary, do we have your permission to call and leave a detailed message on your Preferred Phone number that includes your specific medical information?   Yes      Allergies   Allergen Reactions   • Shellfish-Derived Products - Food Allergy Swelling     Hives and severe swelling      Current Outpatient Medications:   •  dicyclomine (BENTYL) 10 mg capsule, Take 10 mg by mouth, Disp: , Rfl:   •  fexofenadine (ALLEGRA) 180 MG tablet, Take by mouth, Disp: , Rfl:   •  Flaxseed, Linseed, (Flaxseed Oil) 1000 MG CAPS, Take by mouth, Disp: , Rfl:   •  fluticasone (FLONASE) 50 mcg/act nasal spray, , Disp: , Rfl:   •  tamsulosin (FLOMAX) 0 4 mg, Take 0 4 mg by mouth daily, Disp: , Rfl:   •  verapamil (VERELAN) 240 MG 24 hr capsule, TAKE 1 CAPSULE BY MOUTH EVERY NIGHT, Disp: , Rfl:           • Whom besides the patient is providing clinical information about today's encounter?   o NO ADDITIONAL HISTORIAN (patient alone provided history)    Physical Exam and Assessment/Plan by Diagnosis:    SEBORRHEIC KERATOSIS; NON-INFLAMED    Physical Exam:  • Anatomic Location Affected:  Trunk and extremities   • Morphological Description:  Flat and raised, waxy, smooth to warty textured, yellow to brownish-grey to dark brown to blackish, discrete, "stuck-on" appearing papules  • Pertinent Positives:  • Pertinent Negatives: Additional History of Present Condition:  Patient reports new bumps on the skin  Denies itch, burn, pain, bleeding or ulceration  Present constantly; nothing seems to make it worse or better  No prior treatment  Assessment and Plan:  Based on a thorough discussion of this condition and the management approach to it (including a comprehensive discussion of the known risks, side effects and potential benefits of treatment), the patient (family) agrees to implement the following specific plan:  • Reassured benign   • Monitor for any changes     Seborrheic Keratosis  A seborrheic keratosis is a harmless warty spot that appears during adult life as a common sign of skin aging  Seborrheic keratoses can arise on any area of skin, covered or uncovered, with the usual exception of the palms and soles  They do not arise from mucous membranes  Seborrheic keratoses can have highly variable appearance  Seborrheic keratoses are extremely common  It has been estimated that over 90% of adults over the age of 61 years have one or more of them  They occur in males and females of all races, typically beginning to erupt in the 35s or 45s  They are uncommon under the age of 21 years  The precise cause of seborrhoeic keratoses is not known  Seborrhoeic keratoses are considered degenerative in nature  As time goes by, seborrheic keratoses tend to become more numerous  Some people inherit a tendency to develop a very large number of them; some people may have hundreds of them      The name "seborrheic keratosis" is misleading, because these lesions are not limited to a seborrhoeic distribution (scalp, mid-face, chest, upper back), nor are they formed from sebaceous glands, nor are they associated with sebum -- which is greasy  Seborrheic keratosis may also be called "SK," "Seb K," "basal cell papilloma," "senile wart," or "barnacle "      Researchers have noted:  • Eruptive seborrhoeic keratoses can follow sunburn or dermatitis  • Skin friction may be the reason they appear in body folds  • Viral cause (e g , human papillomavirus) seems unlikely  • Stable and clonal mutations or activation of FRFR3, PIK3CA, EDDY, AKT1 and EGFR genes are found in seborrhoeic keratoses  • Seborrhoeic keratosis can arise from solar lentigo  • FRFR3 mutations also arise in solar lentigines  These mutations are associated with increased age and location on the head and neck, suggesting a role of ultraviolet radiation in these lesions  • Seborrheic keratoses do not harbour tumour suppressor gene mutations  • Epidermal growth factor receptor inhibitors, which are used to treat some cancers, often result in an increase in verrucal (warty) keratoses  There is no easy way to remove multiple lesions on a single occasion  Unless a specific lesion is "inflamed" and is causing pain or stinging/burning or is bleeding, most insurance companies do not authorize treatment  NEOPLASM OF UNCERTAIN BEHAVIOR OF SKIN    Physical Exam:  • (Anatomic Location); (Size and Morphological Description); (Differential Diagnosis):  o Left parietal; 0 3 cm verrucous nodule; DDX Verrucous vulgaris versus Seborrheic keratosis versus SCC   o Right cheek; 0 4 cm red erosion; DDX Actinic keratosis versus BCC  • Pertinent Positives:  • Pertinent Negatives: Additional History of Present Condition:  Pt states that both spots have been there for about 1 year  A little crusting and scaling       Assessment and Plan:  • I have discussed with the patient that a sample of skin via a "skin biopsy” would be potentially helpful to further make a specific diagnosis under the microscope  • Based on a thorough discussion of this condition and the management approach to it (including a comprehensive discussion of the known risks, side effects and potential benefits of treatment), the patient (family) agrees to implement the following specific plan:    o Procedure:  Skin Biopsy  After a thorough discussion of treatment options and risk/benefits/alternatives (including but not limited to local pain, scarring, dyspigmentation, blistering, possible superinfection, and inability to confirm a diagnosis via histopathology), verbal and written consent were obtained and portion of the rash was biopsied for tissue sample  See below for consent that was obtained from patient and subsequent Procedure Note  PROCEDURE TANGENTIAL (SHAVE) BIOPSY NOTE:    • Performing Physician: Dr Gasca  • Anatomic Location; Clinical Description with size (cm); Pre-Op Diagnosis:   o Left parietal; 0 3 cm verrucous nodule; DDX Verrucous vulgaris versus Seborrheic keratosis versus SCC   o Right cheek; 0 4 cm red erosion; DDX Actinic keratosis versus BCC  • Post-op diagnosis: Same     • Local anesthesia: 1% xylocaine with epi      • Topical anesthesia: None    • Hemostasis: Aluminum chloride       After obtaining informed consent  at which time there was a discussion about the purpose of biopsy  and low risks of infection and bleeding  The area was prepped and draped in the usual fashion  Anesthesia was obtained with 1% lidocaine with epinephrine  A shave biopsy to an appropriate sampling depth was obtained by Shave (Dermablade or 15 blade) The resulting wound was covered with surgical ointment and bandaged appropriately  The patient tolerated the procedure well without complications and was without signs of functional compromise  Specimen has been sent for review by Dermatopathology      Standard post-procedure care has been explained and has been included in written form within the patient's copy of Informed Consent  INFORMED CONSENT DISCUSSION AND POST-OPERATIVE INSTRUCTIONS FOR PATIENT    I   RATIONALE FOR PROCEDURE  I understand that a skin biopsy allows the Dermatologist to test a lesion or rash under the microscope to obtain a diagnosis  It usually involves numbing the area with numbing medication and removing a small piece of skin; sometimes the area will be closed with sutures  In this specific procedure, sutures are not usually needed  If any sutures are placed, then they are usually need to be removed in 2 weeks or less  I understand that my Dermatologist recommends that a skin "shave" biopsy be performed today  A local anesthetic, similar to the kind that a dentist uses when filling a cavity, will be injected with a very small needle into the skin area to be sampled  The injected skin and tissue underneath "will go to sleep” and become numb so no pain should be felt afterwards  An instrument shaped like a tiny "razor blade" (shave biopsy instrument) will be used to cut a small piece of tissue and skin from the area so that a sample of tissue can be taken and examined more closely under the microscope  A slight amount of bleeding will occur, but it will be stopped with direct pressure and a pressure bandage and any other appropriate methods  I understands that a scar will form where the wound was created  Surgical ointment will be applied to help protect the wound  Sutures are not usually needed      II   RISKS AND POTENTIAL COMPLICATIONS   I understand the risks and potential complications of a skin biopsy include but are not limited to the following:  • Bleeding  • Infection  • Pain  • Scar/keloid  • Skin discoloration  • Incomplete Removal  • Recurrence  • Nerve Damage/Numbness/Loss of Function  • Allergic Reaction to Anesthesia  • Biopsies are diagnostic procedures and based on findings additional treatment or evaluation may be required  • Loss or destruction of specimen resulting in no additional findings    My Dermatologist has explained to me the nature of the condition, the nature of the procedure, and the benefits to be reasonably expected compared with alternative approaches  My Dermatologist has discussed the likelihood of major risks or complications of this procedure including the specific risks listed above, such as bleeding, infection, and scarring/keloid  I understand that a scar is expected after this procedure  I understand that my physician cannot predict if the scar will form a "keloid," which extends beyond the borders of the wound that is created  A keloid is a thick, painful, and bumpy scar  A keloid can be difficult to treat, as it does not always respond well to therapy, which includes injecting cortisone directly into the keloid every few weeks  While this usually reduces the pain and size of the scar, it does not eliminate it  I understand that photographs may be taken before and after the procedure  These will be maintained as part of the medical providers confidential records and may not be made available to me  I further authorize the medical provider to use the photographs for teaching purposes or to illustrate scientific papers, books, or lectures if in his/her judgment, medical research, education, or science may benefit from its use  I have had an opportunity to fully inquire about the risks and benefits of this procedure and its alternatives  I have been given ample time and opportunity to ask questions and to seek a second opinion if I wished to do so  I acknowledge that there have specifically been no guarantees as to the cosmetic results from the procedure  I am aware that with any procedure there is always the possibility of an unexpected complication  III  POST-PROCEDURAL CARE (WHAT YOU WILL NEED TO DO "AFTER THE BIOPSY" TO OPTIMIZE HEALING)    • Keep the area clean and dry    Try NOT to remove the bandage or get it wet for the first 24 hours  • Gently clean the area and apply surgical ointment (such as Vaseline petrolatum ointment, which is available "over the counter" and not a prescription) to the biopsy site for up to 2 weeks straight  This acts to protect the wound from the outside world  • Sutures are not usually placed in this procedure  If any sutures were placed, return for suture removal as instructed (generally 1 week for the face, 2 weeks for the body)  • Take Acetaminophen (Tylenol) for discomfort, if no contraindications  Ibuprofen or aspirin could make bleeding worse  • Call our office immediately for signs of infection: fever, chills, increased redness, warmth, tenderness, discomfort/pain, or pus or foul smell coming from the wound  WHAT TO DO IF THERE IS ANY BLEEDING? If a small amount of bleeding is noticed, place a clean cloth over the area and apply firm pressure for ten minutes  Check the wound after 10 minutes of direct pressure  If bleeding persists, try one more time for an additional 10 minutes of direct pressure on the area  If the bleeding becomes heavier or does not stop after the second attempt, or if you have any other questions about this procedure, then please call your [facility] D[facility]ologist by calling [phone] (SKIN)  I hereby acknowledge that I have reviewed and verified the site with my Dermatologist and have requested and authorized my Dermatologist to proceed with the procedure        Scribe Attestation    I,:  Juvenal De Anda am acting as a scribe while in the presence of the attending physician :       I,:  Nicolette Adair MD personally performed the services described in this documentation    as scribed in my presence :

## 2022-12-21 NOTE — PATIENT INSTRUCTIONS
SEBORRHEIC KERATOSIS; NON-INFLAMED    Assessment and Plan:  Based on a thorough discussion of this condition and the management approach to it (including a comprehensive discussion of the known risks, side effects and potential benefits of treatment), the patient (family) agrees to implement the following specific plan:  Reassured benign   Monitor for any changes     Seborrheic Keratosis  A seborrheic keratosis is a harmless warty spot that appears during adult life as a common sign of skin aging  Seborrheic keratoses can arise on any area of skin, covered or uncovered, with the usual exception of the palms and soles  They do not arise from mucous membranes  Seborrheic keratoses can have highly variable appearance  Seborrheic keratoses are extremely common  It has been estimated that over 90% of adults over the age of 61 years have one or more of them  They occur in males and females of all races, typically beginning to erupt in the 35s or 45s  They are uncommon under the age of 21 years  The precise cause of seborrhoeic keratoses is not known  Seborrhoeic keratoses are considered degenerative in nature  As time goes by, seborrheic keratoses tend to become more numerous  Some people inherit a tendency to develop a very large number of them; some people may have hundreds of them  The name "seborrheic keratosis" is misleading, because these lesions are not limited to a seborrhoeic distribution (scalp, mid-face, chest, upper back), nor are they formed from sebaceous glands, nor are they associated with sebum -- which is greasy    Seborrheic keratosis may also be called "SK," "Seb K," "basal cell papilloma," "senile wart," or "barnacle "      Researchers have noted:  Eruptive seborrhoeic keratoses can follow sunburn or dermatitis  Skin friction may be the reason they appear in body folds  Viral cause (e g , human papillomavirus) seems unlikely  Stable and clonal mutations or activation of FRFR3, PIK3CA, EDDY, AKT1 and EGFR genes are found in seborrhoeic keratoses  Seborrhoeic keratosis can arise from solar lentigo  FRFR3 mutations also arise in solar lentigines  These mutations are associated with increased age and location on the head and neck, suggesting a role of ultraviolet radiation in these lesions  Seborrheic keratoses do not harbour tumour suppressor gene mutations  Epidermal growth factor receptor inhibitors, which are used to treat some cancers, often result in an increase in verrucal (warty) keratoses  There is no easy way to remove multiple lesions on a single occasion  Unless a specific lesion is "inflamed" and is causing pain or stinging/burning or is bleeding, most insurance companies do not authorize treatment  INFORMED CONSENT DISCUSSION AND POST-OPERATIVE INSTRUCTIONS FOR PATIENT    I   RATIONALE FOR PROCEDURE  I understand that a skin biopsy allows the Dermatologist to test a lesion or rash under the microscope to obtain a diagnosis  It usually involves numbing the area with numbing medication and removing a small piece of skin; sometimes the area will be closed with sutures  In this specific procedure, sutures are not usually needed  If any sutures are placed, then they are usually need to be removed in 2 weeks or less  I understand that my Dermatologist recommends that a skin "shave" biopsy be performed today  A local anesthetic, similar to the kind that a dentist uses when filling a cavity, will be injected with a very small needle into the skin area to be sampled  The injected skin and tissue underneath "will go to sleep” and become numb so no pain should be felt afterwards  An instrument shaped like a tiny "razor blade" (shave biopsy instrument) will be used to cut a small piece of tissue and skin from the area so that a sample of tissue can be taken and examined more closely under the microscope    A slight amount of bleeding will occur, but it will be stopped with direct pressure and a pressure bandage and any other appropriate methods  I understands that a scar will form where the wound was created  Surgical ointment will be applied to help protect the wound  Sutures are not usually needed  II   RISKS AND POTENTIAL COMPLICATIONS   I understand the risks and potential complications of a skin biopsy include but are not limited to the following:  Bleeding  Infection  Pain  Scar/keloid  Skin discoloration  Incomplete Removal  Recurrence  Nerve Damage/Numbness/Loss of Function  Allergic Reaction to Anesthesia  Biopsies are diagnostic procedures and based on findings additional treatment or evaluation may be required  Loss or destruction of specimen resulting in no additional findings    My Dermatologist has explained to me the nature of the condition, the nature of the procedure, and the benefits to be reasonably expected compared with alternative approaches  My Dermatologist has discussed the likelihood of major risks or complications of this procedure including the specific risks listed above, such as bleeding, infection, and scarring/keloid  I understand that a scar is expected after this procedure  I understand that my physician cannot predict if the scar will form a "keloid," which extends beyond the borders of the wound that is created  A keloid is a thick, painful, and bumpy scar  A keloid can be difficult to treat, as it does not always respond well to therapy, which includes injecting cortisone directly into the keloid every few weeks  While this usually reduces the pain and size of the scar, it does not eliminate it  I understand that photographs may be taken before and after the procedure  These will be maintained as part of the medical providers confidential records and may not be made available to me    I further authorize the medical provider to use the photographs for teaching purposes or to illustrate scientific papers, books, or lectures if in his/her judgment, medical research, education, or science may benefit from its use  I have had an opportunity to fully inquire about the risks and benefits of this procedure and its alternatives  I have been given ample time and opportunity to ask questions and to seek a second opinion if I wished to do so  I acknowledge that there have specifically been no guarantees as to the cosmetic results from the procedure  I am aware that with any procedure there is always the possibility of an unexpected complication  III  POST-PROCEDURAL CARE (WHAT YOU WILL NEED TO DO "AFTER THE BIOPSY" TO OPTIMIZE HEALING)    Keep the area clean and dry  Try NOT to remove the bandage or get it wet for the first 24 hours  Gently clean the area and apply surgical ointment (such as Vaseline petrolatum ointment, which is available "over the counter" and not a prescription) to the biopsy site for up to 2 weeks straight  This acts to protect the wound from the outside world  Sutures are not usually placed in this procedure  If any sutures were placed, return for suture removal as instructed (generally 1 week for the face, 2 weeks for the body)  Take Acetaminophen (Tylenol) for discomfort, if no contraindications  Ibuprofen or aspirin could make bleeding worse  Call our office immediately for signs of infection: fever, chills, increased redness, warmth, tenderness, discomfort/pain, or pus or foul smell coming from the wound  WHAT TO DO IF THERE IS ANY BLEEDING? If a small amount of bleeding is noticed, place a clean cloth over the area and apply firm pressure for ten minutes  Check the wound after 10 minutes of direct pressure  If bleeding persists, try one more time for an additional 10 minutes of direct pressure on the area    If the bleeding becomes heavier or does not stop after the second attempt, or if you have any other questions about this procedure, then please call [facility] by calling [phone] (SKIN)  I hereby acknowledge that I have reviewed and verified the site with my Dermatologist and have requested and authorized my Dermatologist to proceed with the procedure

## 2023-01-31 ENCOUNTER — OFFICE VISIT (OUTPATIENT)
Dept: DERMATOLOGY | Facility: CLINIC | Age: 63
End: 2023-01-31

## 2023-01-31 VITALS — HEIGHT: 70 IN | BODY MASS INDEX: 31.92 KG/M2 | WEIGHT: 223 LBS

## 2023-01-31 DIAGNOSIS — L57.0 ACTINIC KERATOSES: Primary | ICD-10-CM

## 2023-01-31 DIAGNOSIS — B07.9 VERRUCA: ICD-10-CM

## 2023-01-31 NOTE — PROGRESS NOTES
[facility]     Patient Name: Marene Claude  Encounter Date: 1/31/23     Have you been cared for by a [place] [id number] Dermatologist in the last 3 years and, if so, which description applies to you? Yes  I have been here within the last 3 years, and my medical history has NOT changed since that time  I am MALE/not capable of bearing children  REVIEW OF SYSTEMS:  Have you recently had or currently have any of the following? · No changes in my recent health  PAST MEDICAL HISTORY:  Have you personally ever had or currently have any of the following? If "YES," then please provide more detail  · No changes in my medical history  FAMILY HISTORY:  Any "first degree relatives" (parent, brother, sister, or child) with the following? • No changes in my family's known health  PATIENT EXPERIENCE:    • Do you want the Dermatologist to perform a COMPLETE skin exam today including a clinical examination under the "bra and underwear" areas? NO  • If necessary, do we have your permission to call and leave a detailed message on your Preferred Phone number that includes your specific medical information?   Yes      Allergies   Allergen Reactions   • Shellfish-Derived Products - Food Allergy Swelling     Hives and severe swelling      Current Outpatient Medications:   •  dicyclomine (BENTYL) 10 mg capsule, Take 10 mg by mouth, Disp: , Rfl:   •  fexofenadine (ALLEGRA) 180 MG tablet, Take by mouth, Disp: , Rfl:   •  Flaxseed, Linseed, (Flaxseed Oil) 1000 MG CAPS, Take by mouth, Disp: , Rfl:   •  fluticasone (FLONASE) 50 mcg/act nasal spray, , Disp: , Rfl:   •  tamsulosin (FLOMAX) 0 4 mg, Take 0 4 mg by mouth daily, Disp: , Rfl:   •  verapamil (VERELAN) 240 MG 24 hr capsule, TAKE 1 CAPSULE BY MOUTH EVERY NIGHT, Disp: , Rfl:           • Whom besides the patient is providing clinical information about today's encounter?   o NO ADDITIONAL HISTORIAN (patient alone provided history)    Physical Exam and Assessment/Plan by Diagnosis:    ACTINIC KERATOSIS    Physical Exam:  • Anatomic Location Affected:  Right cheek  • Morphological Description:  Pink patch    Additional History of Present Condition:  Previously biopsied 12/21/2022 accession number [id number]     Assessment and Plan:  Based on a thorough discussion of this condition and the management approach to it (including a comprehensive discussion of the known risks, side effects and potential benefits of treatment), the patient (family) agrees to implement the following specific plan:    • Treated today with cryotherapy  • Consent obtained  • Recommend a yearly skin exam    Actinic keratoses are very common on sites repeatedly exposed to the sun, especially the backs of the hands and the face, most often affecting the ears, nose, cheeks, upper lip, vermilion of the lower lip, temples, forehead and balding scalp  In severely chronically sun-damaged individuals, they may also be found on the upper trunk, upper and lower limbs, and dorsum of feet  We discussed the theoretical premalignant (“pre-cancerous”) nature and etiology of these growths  We discussed the prevailing notion that actinic keratoses are a reflection of abnormal skin cell development due to DNA damage by short wavelength UVB  They are more likely to appear if the immune function is poor, due to aging, recent sun exposure, predisposing disease or certain drugs  We discussed that the main concern is that actinic keratoses may predispose to squamous cell carcinoma  It is rare for a solitary actinic keratosis to evolve to squamous cell carcinoma (SCC), but the risk of SCC occurring at some stage in a patient with more than 10 actinic keratoses is thought to be about 10 to 15%  A tender, thickened, ulcerated or enlarging actinic keratosis is suspicious of SCC  Actinic keratoses may be prevented by strict sun protection   If already present, keratoses may improve with a very high sun protection factor (50+) broad-spectrum sunscreen applied at least daily to affected areas, year-round  We recommend that UPF-rated clothing and hats and sunglasses be worn whenever possible and that a sunscreen-moisturizer combination product such as Neutrogena Daily Defense be applied at least three times a day  We performed a thorough discussion of treatment options and specific risk/benefits/alternatives including but not limited to medical “field” treatment with medications such as the following:    • Topical “field area” medications such as 5-fluorouracil or Aldara (specifically, the trouble with long-term compliance, blistering and local skin reaction versus the convenience of at-home therapy and that field therapy “gets what is not yet seen”)  • Cryotherapy (specifically, local pain, scarring, dyspigmentation, blistering, possible superinfection, and treats “only what we see” versus directed treatment today)  • Photodynamic therapy (specifically, local pain, scarring, dyspigmentation, blistering, possible superinfection, need to schedule for a later date, and time spent in the office versus field therapy that “gets what is not yet seen”)  Shannan Leghorn / ACTINIC KERATOSIS    Physical Exam:  • Anatomic Location Affected:  Left parietal  • Morphological Description:  Pink slightly scaling macule   • Pertinent Positives:  • Pertinent Negatives: Additional History of Present Condition:  Previously biopsied 12/21/22 accession number [id number] - biopsy states the histopathologic findings are suggestive of verruca that has been irritated/traumatized (e g , prurigo nodule-like histopathologic changes)  Much of the squamous atypia appears reactive (due to trauma and/or inflammation)  However, because the base of the lesion is not well visualized, a more significant lesion cannot be excluded      Assessment and Plan:  Based on a thorough discussion of this condition and the management approach to it (including a comprehensive discussion of the known risks, side effects and potential benefits of treatment), the patient (family) agrees to implement the following specific plan:    • Treated today with cryotherapy  • Consent obtained    Verruca Vulgaris  A verruca is a common growth of the skin caused by infection by human papilloma virus (HPV)  There are many strains of the virus that cause different types of warts on the body  The virus infects the most superficial layers of the skin, causing increased production of skin cells and thickening  Warts can be spread through direct contact with infected skin and may spread to other parts of the body if scratched or picked  A verruca is more commonly called a "wart " Warts are particularly common in school-aged children but can arise at any age  Patients who have a history of eczema are especially prone due to impaired skin barrier  Those taking immunosuppressive drugs or with HIV infections may experience prolonged symptoms despite treatment  Warts generally have a rough surface with a tiny black dot sometimes observed in the middle of each scaly spot  They can range in size from a small bump to large scaly growths  Common warts are often found on the backs of fingers or toes, around the nails, and on the knees  Plantar warts can grow inwardly on the soles of the feet causing pain  There are many possible ways to treat warts and sometimes several different treatments are needed to get the warts to go away completely  There is no single perfect treatment for warts, and successful treatment can take many months  In-office treatments usually require multiple visits, and include:  1) Cryotherapy  a cold spray with liquid nitrogen will destroy the infected cells but may lead to discomfort and blistering  It may also leave a permanent white renetta or scar        2) Electrosurgery (curettage and cautery) can be used for large resistant warts which involves shaving the growth down and burning the base  It is performed under local anesthesia and may leave a permanent scar    3) Candida (“yeast”) antigen injections  These are extracts of the common yeast (Candida) that cannot cause an infection  The medication is injected into/under the wart  It is thought to stimulate the immune system to recognize the wart virus and attack it  Multiple injections are often needed about one month apart  There are also several at-home wart treatments:    1) Soak the warts in warm water for 5 minutes every night followed by gentle filing with a nail file or pumice stone  2) Topical salicylic acid or similar compounds work by removing the dead surface skin cells  a  Apply the medicine directly to the wart, wait for it to dry completely, then cover with duct tape overnight   b  Repeat until the wart is gone, which can take 2-4 months  c  Do not use on the face or groin area   d  If the wart paint makes the skin sore, stop treatment until the discomfort has settled, then recommence as above   e  Take care to keep the chemical off normal skin  3) Podophyllin is a cytotoxic agent used in some products and must not be used in pregnancy or women considering pregnancy  4) Some prescription medications include   a  Topical retinoids (adapalene, tretinoin, tazarotene), 5-fluorouracil (Efudex) or imiquimod (Aldara) creams are sometimes used to treat flat warts or warts on the face and other sensitive anatomical areas  They are usually applied directly to the warts once a day for 2-4 months and can be irritating  These treatments should only be used as directed by your health care provider  b  Systemic treatment with oral cimetidine (Tagamet) may help boost the immune system against the wart virus in patients, some of the time    Initiation of cimetidine therapy should ONLY be done under the supervision of your health care provider, who can discuss possible side effects and drug-to-drug interactions of this specific treatment  PROCEDURE:  DESTRUCTION OF PRE-MALIGNANT LESIONS  After a thorough discussion of treatment options and risk/benefits/alternatives (including but not limited to local pain, scarring, dyspigmentation, blistering, and possible superinfection), verbal and written consent were obtained and the aforementioned lesions were treated on with cryotherapy using liquid nitrogen x 1 cycle for 5-10 seconds  • TOTAL NUMBER of 2 pre-malignant lesions were treated today on the ANATOMIC LOCATION: Right cheek and parietal scalp   The patient tolerated the procedure well, and after-care instructions were provided      Scribe Attestation    I,:  Micky Lange MA am acting as a scribe while in the presence of the attending physician :       I,:  Venus Richards MD personally performed the services described in this documentation    as scribed in my presence :

## 2024-02-08 ENCOUNTER — OFFICE VISIT (OUTPATIENT)
Dept: DERMATOLOGY | Facility: CLINIC | Age: 64
End: 2024-02-08
Payer: COMMERCIAL

## 2024-02-08 VITALS — TEMPERATURE: 97.9 F | WEIGHT: 221 LBS | BODY MASS INDEX: 31.71 KG/M2

## 2024-02-08 DIAGNOSIS — D22.71 MULTIPLE BENIGN MELANOCYTIC NEVI OF BOTH UPPER EXTREMITIES, BOTH LOWER EXTREMITIES, AND TRUNK: ICD-10-CM

## 2024-02-08 DIAGNOSIS — D22.62 MULTIPLE BENIGN MELANOCYTIC NEVI OF BOTH UPPER EXTREMITIES, BOTH LOWER EXTREMITIES, AND TRUNK: ICD-10-CM

## 2024-02-08 DIAGNOSIS — Z12.83 SKIN EXAM, SCREENING FOR CANCER: Primary | ICD-10-CM

## 2024-02-08 DIAGNOSIS — L73.8 SEBACEOUS HYPERPLASIA: ICD-10-CM

## 2024-02-08 DIAGNOSIS — D22.72 MULTIPLE BENIGN MELANOCYTIC NEVI OF BOTH UPPER EXTREMITIES, BOTH LOWER EXTREMITIES, AND TRUNK: ICD-10-CM

## 2024-02-08 DIAGNOSIS — D22.61 MULTIPLE BENIGN MELANOCYTIC NEVI OF BOTH UPPER EXTREMITIES, BOTH LOWER EXTREMITIES, AND TRUNK: ICD-10-CM

## 2024-02-08 DIAGNOSIS — B07.9 VERRUCA: ICD-10-CM

## 2024-02-08 DIAGNOSIS — D22.5 MULTIPLE BENIGN MELANOCYTIC NEVI OF BOTH UPPER EXTREMITIES, BOTH LOWER EXTREMITIES, AND TRUNK: ICD-10-CM

## 2024-02-08 DIAGNOSIS — L82.1 SEBORRHEIC KERATOSES: ICD-10-CM

## 2024-02-08 DIAGNOSIS — L57.0 KERATOSIS, ACTINIC: ICD-10-CM

## 2024-02-08 DIAGNOSIS — D18.01 CHERRY ANGIOMA: ICD-10-CM

## 2024-02-08 DIAGNOSIS — L81.4 SOLAR LENTIGO: ICD-10-CM

## 2024-02-08 PROCEDURE — 99213 OFFICE O/P EST LOW 20 MIN: CPT | Performed by: DERMATOLOGY

## 2024-02-08 PROCEDURE — 17003 DESTRUCT PREMALG LES 2-14: CPT | Performed by: DERMATOLOGY

## 2024-02-08 PROCEDURE — 17000 DESTRUCT PREMALG LESION: CPT | Performed by: DERMATOLOGY

## 2024-02-08 RX ORDER — ATORVASTATIN CALCIUM 20 MG/1
TABLET, FILM COATED ORAL
COMMUNITY
Start: 2023-12-14

## 2024-02-08 NOTE — PATIENT INSTRUCTIONS
"[facility]     Physical Exam:  Anatomic Location Affected:  Trunk and extremities  Morphological Description:  Scattered cherry red papules  Denies pain, itch, bleeding. No treatments tried. Present for years. Present constantly; no modifying factors which make it worse or better.     Assessment and Plan:  Based on a thorough discussion of this condition and the management approach to it (including a comprehensive discussion of the known risks, side effects and potential benefits of treatment), the patient (family) agrees to implement the following specific plan:  Reassure benign      SEBORRHEIC KERATOSIS; NON-INFLAMED     Physical Exam:  Anatomic Location Affected:  Trunk and extremities  Morphological Description:  Waxy, smooth to warty textured, yellow to brownish-grey to dark brown to blackish, discrete, \"stuck-on\" appearing papules.  Present for years. Denies pain, itch, bleeding.      Additional History of Present Condition:  Present constantly; no modifying factors which make it worse or better. No prior treatment.       Assessment and Plan:  Based on a thorough discussion of this condition and the management approach to it (including a comprehensive discussion of the known risks, side effects and potential benefits of treatment), the patient (family) agrees to implement the following specific plan:  Reassure benign  Use sun protection.  Apply SPF 30 or higher at least three times a day.  Wear sun protecting clothing and hats.      SOLAR LENTIGINES   OTHER SKIN CHANGES DUE TO CHRONIC EXPOSURE TO NONIONIZING RADIATION     Physical Exam:  Anatomic Location Affected:  Sun exposed areas of back, chest, arms, legs  Morphological Description:  Multiple scattered brown to tan evenly pigmented macules   Denies pain, itch, bleeding. No treatments tried. Present for months - years. Reports getting newer lesions with sun exposure.       Assessment and Plan:  Based on a thorough discussion of this condition and the " "management approach to it (including a comprehensive discussion of the known risks, side effects and potential benefits of treatment), the patient (family) agrees to implement the following specific plan:  Reassure benign  Use sun protection.  Apply SPF 30 or higher at least three times a day.  Wear sun protecting clothing and hats.      MULTIPLE MELANOCYTIC NEVI (\"Moles\")     Physical Exam:  Anatomic Location Affected: Trunk and extremities  Morphological Description:  Scattered, round to ovoid, symmetrical-appearing, even bordered, skin colored to dark brown macules/papules  Denies pain, itch, bleeding. No treatments tried. Present for years. Present constantly; no modifying factors which make it worse or better. Denies actively changing or growing moles.      Assessment and Plan:  Based on a thorough discussion of this condition and the management approach to it (including a comprehensive discussion of the known risks, side effects and potential benefits of treatment), the patient (family) agrees to implement the following specific plan:  Reassure benign  Monitor for changes  Use sun protection.  Apply SPF 30 or higher at least three times a day.  Wear sun protecting clothing and hats.     Worrisome signs of skin malignancy discussed, questions answered. Regular self-skin check discussed. Advised to call or return to office if patient notices any spots of concern, rapidly growing/changing lesions, bleeding lesions, non-healing lesions. Advised regular SPF use.     VERRUCA / ACTINIC KERATOSIS      Assessment and Plan:  Based on a thorough discussion of this condition and the management approach to it (including a comprehensive discussion of the known risks, side effects and potential benefits of treatment), the patient (family) agrees to implement the following specific plan:     Treated today with cryotherapy  Consent obtained     Verruca Vulgaris  A verruca is a common growth of the skin caused by infection by " "human papilloma virus (HPV). There are many strains of the virus that cause different types of warts on the body. The virus infects the most superficial layers of the skin, causing increased production of skin cells and thickening. Warts can be spread through direct contact with infected skin and may spread to other parts of the body if scratched or picked.      A verruca is more commonly called a \"wart.\" Warts are particularly common in school-aged children but can arise at any age. Patients who have a history of eczema are especially prone due to impaired skin barrier. Those taking immunosuppressive drugs or with HIV infections may experience prolonged symptoms despite treatment.      Warts generally have a rough surface with a tiny black dot sometimes observed in the middle of each scaly spot. They can range in size from a small bump to large scaly growths.  Common warts are often found on the backs of fingers or toes, around the nails, and on the knees. Plantar warts can grow inwardly on the soles of the feet causing pain.     There are many possible ways to treat warts and sometimes several different treatments are needed to get the warts to go away completely. There is no single perfect treatment for warts, and successful treatment can take many months.      In-office treatments usually require multiple visits, and include:  Cryotherapy. a cold spray with liquid nitrogen will destroy the infected cells but may lead to discomfort and blistering. It may also leave a permanent white renetta or scar.       Electrosurgery (curettage and cautery) can be used for large resistant warts which involves shaving the growth down and burning the base. It is performed under local anesthesia and may leave a permanent scar     Candida (“yeast”) antigen injections. These are extracts of the common yeast (Candida) that cannot cause an infection. The medication is injected into/under the wart. It is thought to stimulate the immune " system to recognize the wart virus and attack it. Multiple injections are often needed about one month apart.     There are also several at-home wart treatments:     Soak the warts in warm water for 5 minutes every night followed by gentle filing with a nail file or pumice stone.     Topical salicylic acid or similar compounds work by removing the dead surface skin cells  Apply the medicine directly to the wart, wait for it to dry completely, then cover with duct tape overnight   Repeat until the wart is gone, which can take 2-4 months  Do not use on the face or groin area   If the wart paint makes the skin sore, stop treatment until the discomfort has settled, then recommence as above.  Take care to keep the chemical off normal skin.     Podophyllin is a cytotoxic agent used in some products and must not be used in pregnancy or women considering pregnancy.     Some prescription medications include   Topical retinoids (adapalene, tretinoin, tazarotene), 5-fluorouracil (Efudex) or imiquimod (Aldara) creams are sometimes used to treat flat warts or warts on the face and other sensitive anatomical areas. They are usually applied directly to the warts once a day for 2-4 months and can be irritating.  These treatments should only be used as directed by your health care provider.  Systemic treatment with oral cimetidine (Tagamet) may help boost the immune system against the wart virus in patients, some of the time.  Initiation of cimetidine therapy should ONLY be done under the supervision of your health care provider, who can discuss possible side effects and drug-to-drug interactions of this specific treatment.      PROCEDURE:  DESTRUCTION OF PRE-MALIGNANT LESIONS  After a thorough discussion of treatment options and risk/benefits/alternatives (including but not limited to local pain, scarring, dyspigmentation, blistering, and possible superinfection), verbal and written consent were obtained and the aforementioned  lesions were treated on with cryotherapy using liquid nitrogen x 1 cycle for 5-10 seconds.     TOTAL NUMBER of 2 pre-malignant lesions were treated today on the ANATOMIC LOCATION: Right cheek and leftparietal scalp .               The patient tolerated the procedure well, and after-care instructions were provided.    SEBACEOUS HYPERPLASIA    Assessment and Plan:  Based on a thorough discussion of this condition and the management approach to it (including a comprehensive discussion of the known risks, side effects and potential benefits of treatment), the patient (family) agrees to implement the following specific plan:  Reassure benign    Sebaceous Hyperplasia  Sebaceous hyperplasia is a common, benign condition of enlarged oil secreting (sebaceous) glands commonly found on the forehead and cheeks of middle-aged and elderly patients. They normally appear as small yellow bumps up to 3mm in diameter that can be single or multiple. The bumps on the face often display a centrall dell. Occasionally, these bumps can occur on the chest, areola, mouth, and genitals. Rarely, they can grow to take a giant form, or be arranged linearly.     Causes of sebaceous hyperplasia  Sebaceous hyperplasic is a form of benign hair follicle tumor and can often be confused with basal cell carcinoma. It can be more prevalent in immunosuppressed patients such as those undergoing organ transplantation. In the rare Acosta-Rolanda syndrome, sebaceous hyperplasia occurs in association with internal cancers.  Lesions of sebaceous hyperplasia are benign, with no known potential for malignant transformation, but they may be associated with nonmelanoma skin cancer in transplantation patients.     How we do diagnose sebaceous hyperplasia?  Your dermatologist may take a closer look at the bumps with a device called a dermatoscope. Common features include a central hair follicle surrounded by yellowish lobules with prominent blood vessels.     What is the  treatment of sebaceous hyperplasia?   Since sebaceous hyperplasia is benign with no known potential for transformation into cancer, treatment is mostly for cosmetic reasons or if the lesions become irritated. Options include   Light electrocautery or laser vaporization  Oral isotrentinoin is effective for extensive or disfiguring spots, but do not prevent recurrence   Antiandrogens may be used in females to decrease the size and improve overall appearance of bumps

## 2024-02-08 NOTE — PROGRESS NOTES
"[facility] Note     Patient Name: Ra Corbett  Encounter Date: 2/8/2024     Have you been cared for by a [facility] Dermatologist in the last 3 years and, if so, which description applies to you?    Yes.  I have been here within the last 3 years, and my medical history has NOT changed since that time.  I am MALE/not capable of bearing children.    REVIEW OF SYSTEMS:  Have you recently had or currently have any of the following? No changes in my recent health.   PAST MEDICAL HISTORY:  Have you personally ever had or currently have any of the following?  If \"YES,\" then please provide more detail. No changes in my medical history.   HISTORY OF IMMUNOSUPPRESSION: Do you have a history of any of the following:  Systemic Immunosuppression such as Diabetes, Biologic or Immunotherapy, Chemotherapy, Organ Transplantation, Bone Marrow Transplantation?  No     Answering \"YES\" requires the addition of the dotphrase \"IMMUNOSUPPRESSED\" as the first diagnosis of the patient's visit.   FAMILY HISTORY:  Any \"first degree relatives\" (parent, brother, sister, or child) with the following?    No changes in my family's known health.   PATIENT EXPERIENCE:    Do you want the Dermatologist to perform a COMPLETE skin exam today including a clinical examination under the \"bra and underwear\" areas?  Yes  If necessary, do we have your permission to call and leave a detailed message on your Preferred Phone number that includes your specific medical information?  Yes      Allergies   Allergen Reactions   • Shellfish-Derived Products - Food Allergy Swelling     Hives and severe swelling      Current Outpatient Medications:   •  atorvastatin (LIPITOR) 20 mg tablet, Take by mouth, Disp: , Rfl:   •  fexofenadine (ALLEGRA) 180 MG tablet, Take by mouth, Disp: , Rfl:   •  Flaxseed, Linseed, (Flaxseed Oil) 1000 MG CAPS, Take by mouth, Disp: , Rfl:   •  fluticasone (FLONASE) 50 mcg/act nasal spray, , Disp: , Rfl:   •  tamsulosin " "(FLOMAX) 0.4 mg, Take 0.4 mg by mouth daily, Disp: , Rfl:   •  verapamil (VERELAN) 240 MG 24 hr capsule, TAKE 1 CAPSULE BY MOUTH EVERY NIGHT, Disp: , Rfl:   •  dicyclomine (BENTYL) 10 mg capsule, Take 10 mg by mouth, Disp: , Rfl:         Whom besides the patient is providing clinical information about today's encounter?   NO ADDITIONAL HISTORIAN (patient alone provided history)    Physical Exam and Assessment/Plan by Diagnosis:    CHERRY ANGIOMAS     Physical Exam:  Anatomic Location Affected:  Trunk and extremities  Morphological Description:  Scattered cherry red papules  Denies pain, itch, bleeding. No treatments tried. Present for years. Present constantly; no modifying factors which make it worse or better.     Assessment and Plan:  Based on a thorough discussion of this condition and the management approach to it (including a comprehensive discussion of the known risks, side effects and potential benefits of treatment), the patient (family) agrees to implement the following specific plan:  Reassure benign      SEBORRHEIC KERATOSIS; NON-INFLAMED     Physical Exam:  Anatomic Location Affected:  Trunk and extremities  Morphological Description:  Waxy, smooth to warty textured, yellow to brownish-grey to dark brown to blackish, discrete, \"stuck-on\" appearing papules.  Present for years. Denies pain, itch, bleeding.      Additional History of Present Condition:  Present constantly; no modifying factors which make it worse or better. No prior treatment.       Assessment and Plan:  Based on a thorough discussion of this condition and the management approach to it (including a comprehensive discussion of the known risks, side effects and potential benefits of treatment), the patient (family) agrees to implement the following specific plan:  Reassure benign  Use sun protection.  Apply SPF 30 or higher at least three times a day.  Wear sun protecting clothing and hats.      SOLAR LENTIGINES   OTHER SKIN CHANGES DUE TO " "CHRONIC EXPOSURE TO NONIONIZING RADIATION     Physical Exam:  Anatomic Location Affected:  Sun exposed areas of back, chest, arms, legs  Morphological Description:  Multiple scattered brown to tan evenly pigmented macules   Denies pain, itch, bleeding. No treatments tried. Present for months - years. Reports getting newer lesions with sun exposure.       Assessment and Plan:  Based on a thorough discussion of this condition and the management approach to it (including a comprehensive discussion of the known risks, side effects and potential benefits of treatment), the patient (family) agrees to implement the following specific plan:  Reassure benign  Use sun protection.  Apply SPF 30 or higher at least three times a day.  Wear sun protecting clothing and hats.      MULTIPLE MELANOCYTIC NEVI (\"Moles\")     Physical Exam:  Anatomic Location Affected: Trunk and extremities  Morphological Description:  Scattered, round to ovoid, symmetrical-appearing, even bordered, skin colored to dark brown macules/papules  Denies pain, itch, bleeding. No treatments tried. Present for years. Present constantly; no modifying factors which make it worse or better. Denies actively changing or growing moles.      Assessment and Plan:  Based on a thorough discussion of this condition and the management approach to it (including a comprehensive discussion of the known risks, side effects and potential benefits of treatment), the patient (family) agrees to implement the following specific plan:  Reassure benign  Monitor for changes  Use sun protection.  Apply SPF 30 or higher at least three times a day.  Wear sun protecting clothing and hats.     Worrisome signs of skin malignancy discussed, questions answered. Regular self-skin check discussed. Advised to call or return to office if patient notices any spots of concern, rapidly growing/changing lesions, bleeding lesions, non-healing lesions. Advised regular SPF use.     VERRUCA / ACTINIC " "KERATOSIS     Physical Exam:  Anatomic Location Affected:  Left frontal scalp, right cheek  Morphological Description:  Pink slightly scaly papule  Pertinent Positives:  Pertinent Negatives:     Additional History of Present Condition:  present for several months     Assessment and Plan:  Based on a thorough discussion of this condition and the management approach to it (including a comprehensive discussion of the known risks, side effects and potential benefits of treatment), the patient (family) agrees to implement the following specific plan:     Treated today with cryotherapy  Consent obtained  Continue with annual skin checks unless new or changing lesions appear sooner.     Verruca Vulgaris  A verruca is a common growth of the skin caused by infection by human papilloma virus (HPV). There are many strains of the virus that cause different types of warts on the body. The virus infects the most superficial layers of the skin, causing increased production of skin cells and thickening. Warts can be spread through direct contact with infected skin and may spread to other parts of the body if scratched or picked.      A verruca is more commonly called a \"wart.\" Warts are particularly common in school-aged children but can arise at any age. Patients who have a history of eczema are especially prone due to impaired skin barrier. Those taking immunosuppressive drugs or with HIV infections may experience prolonged symptoms despite treatment.      Warts generally have a rough surface with a tiny black dot sometimes observed in the middle of each scaly spot. They can range in size from a small bump to large scaly growths.  Common warts are often found on the backs of fingers or toes, around the nails, and on the knees. Plantar warts can grow inwardly on the soles of the feet causing pain.     There are many possible ways to treat warts and sometimes several different treatments are needed to get the warts to go away " completely. There is no single perfect treatment for warts, and successful treatment can take many months.      In-office treatments usually require multiple visits, and include:  Cryotherapy. a cold spray with liquid nitrogen will destroy the infected cells but may lead to discomfort and blistering. It may also leave a permanent white renetta or scar.       Electrosurgery (curettage and cautery) can be used for large resistant warts which involves shaving the growth down and burning the base. It is performed under local anesthesia and may leave a permanent scar     Candida (“yeast”) antigen injections. These are extracts of the common yeast (Candida) that cannot cause an infection. The medication is injected into/under the wart. It is thought to stimulate the immune system to recognize the wart virus and attack it. Multiple injections are often needed about one month apart.     There are also several at-home wart treatments:     Soak the warts in warm water for 5 minutes every night followed by gentle filing with a nail file or pumice stone.     Topical salicylic acid or similar compounds work by removing the dead surface skin cells  Apply the medicine directly to the wart, wait for it to dry completely, then cover with duct tape overnight   Repeat until the wart is gone, which can take 2-4 months  Do not use on the face or groin area   If the wart paint makes the skin sore, stop treatment until the discomfort has settled, then recommence as above.  Take care to keep the chemical off normal skin.     Podophyllin is a cytotoxic agent used in some products and must not be used in pregnancy or women considering pregnancy.     Some prescription medications include   Topical retinoids (adapalene, tretinoin, tazarotene), 5-fluorouracil (Efudex) or imiquimod (Aldara) creams are sometimes used to treat flat warts or warts on the face and other sensitive anatomical areas. They are usually applied directly to the warts once a  day for 2-4 months and can be irritating.  These treatments should only be used as directed by your health care provider.  Systemic treatment with oral cimetidine (Tagamet) may help boost the immune system against the wart virus in patients, some of the time.  Initiation of cimetidine therapy should ONLY be done under the supervision of your health care provider, who can discuss possible side effects and drug-to-drug interactions of this specific treatment.      PROCEDURE:  DESTRUCTION OF PRE-MALIGNANT LESIONS  After a thorough discussion of treatment options and risk/benefits/alternatives (including but not limited to local pain, scarring, dyspigmentation, blistering, and possible superinfection), verbal and written consent were obtained and the aforementioned lesions were treated on with cryotherapy using liquid nitrogen x 1 cycle for 5-10 seconds.     TOTAL NUMBER of 2 pre-malignant lesions were treated today on the ANATOMIC LOCATION: Right cheek and left frontal scalp .               The patient tolerated the procedure well, and after-care instructions were provided.    SEBACEOUS HYPERPLASIA    Physical Exam:  Anatomic Location Affected:  face, scattered  Morphological Description:  skin colored to yellow papules  Pertinent Positives:  Pertinent Negatives:    Additional History of Present Condition:  found on exam    Assessment and Plan:  Based on a thorough discussion of this condition and the management approach to it (including a comprehensive discussion of the known risks, side effects and potential benefits of treatment), the patient (family) agrees to implement the following specific plan:  Reassure benign  Discussed options for laser therapy if desired. Patient aware this is a cosmetic service.    Sebaceous Hyperplasia  Sebaceous hyperplasia is a common, benign condition of enlarged oil secreting (sebaceous) glands commonly found on the forehead and cheeks of middle-aged and elderly patients. They  normally appear as small yellow bumps up to 3mm in diameter that can be single or multiple. The bumps on the face often display a centrall dell. Occasionally, these bumps can occur on the chest, areola, mouth, and genitals. Rarely, they can grow to take a giant form, or be arranged linearly.     Causes of sebaceous hyperplasia  Sebaceous hyperplasic is a form of benign hair follicle tumor and can often be confused with basal cell carcinoma. It can be more prevalent in immunosuppressed patients such as those undergoing organ transplantation. In the rare Acosat-Joppa syndrome, sebaceous hyperplasia occurs in association with internal cancers.  Lesions of sebaceous hyperplasia are benign, with no known potential for malignant transformation, but they may be associated with nonmelanoma skin cancer in transplantation patients.     How we do diagnose sebaceous hyperplasia?  Your dermatologist may take a closer look at the bumps with a device called a dermatoscope. Common features include a central hair follicle surrounded by yellowish lobules with prominent blood vessels.     What is the treatment of sebaceous hyperplasia?   Since sebaceous hyperplasia is benign with no known potential for transformation into cancer, treatment is mostly for cosmetic reasons or if the lesions become irritated. Options include   Light electrocautery or laser vaporization  Oral isotrentinoin is effective for extensive or disfiguring spots, but do not prevent recurrence   Antiandrogens may be used in females to decrease the size and improve overall appearance of bumps       Scribe Attestation    I,:  Nu Sahil am acting as a scribe while in the presence of the attending physician.:       I,:  Renaldo Gasca MD personally performed the services described in this documentation    as scribed in my presence.:         Patient was seen and discussed with Dr. Campos Cosby PA-C

## 2024-03-26 ENCOUNTER — OFFICE VISIT (OUTPATIENT)
Dept: DERMATOLOGY | Facility: CLINIC | Age: 64
End: 2024-03-26
Payer: COMMERCIAL

## 2024-03-26 VITALS — WEIGHT: 220.46 LBS | BODY MASS INDEX: 31.56 KG/M2 | TEMPERATURE: 97.5 F | HEIGHT: 70 IN

## 2024-03-26 DIAGNOSIS — B07.9 VERRUCA: Primary | ICD-10-CM

## 2024-03-26 DIAGNOSIS — L57.0 ACTINIC KERATOSES: ICD-10-CM

## 2024-03-26 PROCEDURE — 17000 DESTRUCT PREMALG LESION: CPT

## 2024-03-26 NOTE — PATIENT INSTRUCTIONS
Assessment and Plan:  Based on a thorough discussion of this condition and the management approach to it (including a comprehensive discussion of the known risks, side effects and potential benefits of treatment), the patient (family) agrees to implement the following specific plan:     Treated today with cryotherapy  Consent obtained  Continue with annual skin checks unless new or changing lesions appear sooner.  If lesion doesn't clear after cryotherapy today - will biopsy

## 2024-03-26 NOTE — PROGRESS NOTES
"[facility] Note     Patient Name: Ra Corbett  Encounter Date: 3/26/2024     Have you been cared for by a [facility] Dermatologist in the last 3 years and, if so, which description applies to you?    Yes.  I have been here within the last 3 years, and my medical history has NOT changed since that time.  I am MALE/not capable of bearing children.    REVIEW OF SYSTEMS:  Have you recently had or currently have any of the following? No changes in my recent health.   PAST MEDICAL HISTORY:  Have you personally ever had or currently have any of the following?  If \"YES,\" then please provide more detail. No changes in my medical history.   HISTORY OF IMMUNOSUPPRESSION: Do you have a history of any of the following:  Systemic Immunosuppression such as Diabetes, Biologic or Immunotherapy, Chemotherapy, Organ Transplantation, Bone Marrow Transplantation?  No     Answering \"YES\" requires the addition of the dotphrase \"IMMUNOSUPPRESSED\" as the first diagnosis of the patient's visit.   FAMILY HISTORY:  Any \"first degree relatives\" (parent, brother, sister, or child) with the following?    No changes in my family's known health.   PATIENT EXPERIENCE:    Do you want the Dermatologist to perform a COMPLETE skin exam today including a clinical examination under the \"bra and underwear\" areas?  NO  If necessary, do we have your permission to call and leave a detailed message on your Preferred Phone number that includes your specific medical information?  Yes      Allergies   Allergen Reactions    Shellfish-Derived Products - Food Allergy Swelling     Hives and severe swelling      Current Outpatient Medications:     atorvastatin (LIPITOR) 20 mg tablet, Take by mouth, Disp: , Rfl:     fexofenadine (ALLEGRA) 180 MG tablet, Take by mouth, Disp: , Rfl:     Flaxseed, Linseed, (Flaxseed Oil) 1000 MG CAPS, Take by mouth, Disp: , Rfl:     fluticasone (FLONASE) 50 mcg/act nasal spray, , Disp: , Rfl:     tamsulosin " (FLOMAX) 0.4 mg, Take 0.4 mg by mouth daily, Disp: , Rfl:     verapamil (VERELAN) 240 MG 24 hr capsule, TAKE 1 CAPSULE BY MOUTH EVERY NIGHT, Disp: , Rfl:     dicyclomine (BENTYL) 10 mg capsule, Take 10 mg by mouth, Disp: , Rfl:           Whom besides the patient is providing clinical information about today's encounter?   NO ADDITIONAL HISTORIAN (patient alone provided history)    Physical Exam and Assessment/Plan by Diagnosis:    VERRUCA / ACTINIC KERATOSIS     Physical Exam:  Anatomic Location Affected:   right cheek  Morphological Description:  scaly papule  Pertinent Positives:  Pertinent Negatives:     Additional History of Present Condition:  present for several months. Previously treated with cryotherapy at the beginning of February. Left frontal scalp lesion looks all but clear today. Right cheek has fallen off a bit, but still present.     Assessment and Plan:  Based on a thorough discussion of this condition and the management approach to it (including a comprehensive discussion of the known risks, side effects and potential benefits of treatment), the patient (family) agrees to implement the following specific plan:     Lesion on scalp is resolved, no treatment today  Right cheek treated again today with cryotherapy  Consent obtained  Continue with annual skin checks unless new or changing lesions appear sooner.  If lesion doesn't clear after cryotherapy today - will biopsy     PROCEDURE:  DESTRUCTION OF BENIGN/Pre-malignant LESIONS WITH CRYOTHERAPY   After a thorough discussion of treatment options and risk/benefits/alternatives (including but not limited to local pain, scarring, dyspigmentation, blistering, and possible superinfection), verbal and written consent were obtained and the aforementioned lesions were treated on with cryotherapy using liquid nitrogen x 3 cycle for 5-10 seconds.    TOTAL NUMBER of 1 lesions were treated today on the ANATOMIC LOCATION: right cheek.    The patient tolerated the  procedure well, and after-care instructions were provided.      Scribe Attestation      I,:  Lorri De Los Santos am acting as a scribe while in the presence of the attending physician.:       I,:  Senia Cosby PA-C personally performed the services described in this documentation    as scribed in my presence.:

## 2024-04-29 ENCOUNTER — OFFICE VISIT (OUTPATIENT)
Dept: DERMATOLOGY | Facility: CLINIC | Age: 64
End: 2024-04-29
Payer: COMMERCIAL

## 2024-04-29 VITALS — TEMPERATURE: 98 F | WEIGHT: 219 LBS | BODY MASS INDEX: 31.35 KG/M2 | HEIGHT: 70 IN

## 2024-04-29 DIAGNOSIS — L82.1 SEBORRHEIC KERATOSIS: Primary | ICD-10-CM

## 2024-04-29 DIAGNOSIS — B07.9 VERRUCA: ICD-10-CM

## 2024-04-29 DIAGNOSIS — L57.0 ACTINIC KERATOSES: ICD-10-CM

## 2024-04-29 PROCEDURE — 99213 OFFICE O/P EST LOW 20 MIN: CPT

## 2024-04-29 NOTE — PROGRESS NOTES
"[facility] Note     Patient Name: Ra Corbett  Encounter Date: 4/29/2024     Have you been cared for by a [facility] Dermatologist in the last 3 years and, if so, which description applies to you?    Yes.  I have been here within the last 3 years, and my medical history has NOT changed since that time.  I am MALE/not capable of bearing children.    REVIEW OF SYSTEMS:  Have you recently had or currently have any of the following? No changes in my recent health.   PAST MEDICAL HISTORY:  Have you personally ever had or currently have any of the following?  If \"YES,\" then please provide more detail. No changes in my medical history.   HISTORY OF IMMUNOSUPPRESSION: Do you have a history of any of the following:  Systemic Immunosuppression such as Diabetes, Biologic or Immunotherapy, Chemotherapy, Organ Transplantation, Bone Marrow Transplantation?  No     Answering \"YES\" requires the addition of the dotphrase \"IMMUNOSUPPRESSED\" as the first diagnosis of the patient's visit.   FAMILY HISTORY:  Any \"first degree relatives\" (parent, brother, sister, or child) with the following?    No changes in my family's known health.   PATIENT EXPERIENCE:    Do you want the Dermatologist to perform a COMPLETE skin exam today including a clinical examination under the \"bra and underwear\" areas?  NO  If necessary, do we have your permission to call and leave a detailed message on your Preferred Phone number that includes your specific medical information?  Yes      Allergies   Allergen Reactions    Shellfish-Derived Products - Food Allergy Swelling     Hives and severe swelling      Current Outpatient Medications:     atorvastatin (LIPITOR) 20 mg tablet, Take by mouth, Disp: , Rfl:     fexofenadine (ALLEGRA) 180 MG tablet, Take by mouth, Disp: , Rfl:     Flaxseed, Linseed, (Flaxseed Oil) 1000 MG CAPS, Take by mouth, Disp: , Rfl:     fluticasone (FLONASE) 50 mcg/act nasal spray, , Disp: , Rfl:     tamsulosin " "(FLOMAX) 0.4 mg, Take 0.4 mg by mouth daily, Disp: , Rfl:     verapamil (VERELAN) 240 MG 24 hr capsule, TAKE 1 CAPSULE BY MOUTH EVERY NIGHT, Disp: , Rfl:     dicyclomine (BENTYL) 10 mg capsule, Take 10 mg by mouth, Disp: , Rfl:           Whom besides the patient is providing clinical information about today's encounter?   NO ADDITIONAL HISTORIAN (patient alone provided history)    Physical Exam and Assessment/Plan by Diagnosis:      VERRUCA/ACTINIC KERATOSIS     Physical Exam:  Anatomic Location Affected:  Right cheek   Morphological Description:  Resolved    Additional History of Present Condition:  Treated it with cryotherapy at prior visit on 3/26/2024.    Assessment and Plan:  Based on a thorough discussion of this condition and the management approach to it (including a comprehensive discussion of the known risks, side effects and potential benefits of treatment), the patient (family) agrees to implement the following specific plan:  Lesion has resolved. No further treatment necessary.  Advised if lesion were to recur, call the office and schedule a follow-up.       SEBORRHEIC KERATOSIS; NON-INFLAMED    Physical Exam:  Anatomic Location Affected:  Left flank   Morphological Description:  Flat and raised, waxy, smooth to warty textured, yellow to brownish-grey to dark brown to blackish, discrete, \"stuck-on\" appearing papule      Additional History of Present Condition:  Denies itch, burn, pain, bleeding or ulceration.  Present constantly; nothing seems to make it worse or better.  No prior treatment.      Assessment and Plan:  Based on a thorough discussion of this condition and the management approach to it (including a comprehensive discussion of the known risks, side effects and potential benefits of treatment), the patient (family) agrees to implement the following specific plan:  Reassured benign       Follow-up: as needed.     Scribe Attestation      I,:  Joanna Yañez am acting as a scribe while in the " presence of the attending physician.:       I,:  Lynda Roman PA-C personally performed the services described in this documentation    as scribed in my presence.:

## 2025-02-18 ENCOUNTER — TELEPHONE (OUTPATIENT)
Dept: DERMATOLOGY | Facility: CLINIC | Age: 65
End: 2025-02-18

## 2025-02-18 NOTE — TELEPHONE ENCOUNTER
Called patient regarding his upcoming appt next week. Insurance on file is coming back as inactive. Left a message for patient to call the office with updated insurance information.

## 2025-02-18 NOTE — TELEPHONE ENCOUNTER
Received return call from Patient.     Added Patient's Medicare coverage. Termed E-BLUE CROSS/INDEPENDENCE PERSONAL CHOICE.    Was unable to create [employer] coverage because the query was unable to process.   [employer] Supplement Membership ID [id number]

## 2025-02-27 ENCOUNTER — OFFICE VISIT (OUTPATIENT)
Dept: DERMATOLOGY | Facility: CLINIC | Age: 65
End: 2025-02-27
Payer: MEDICARE

## 2025-02-27 VITALS — TEMPERATURE: 97.8 F | WEIGHT: 230 LBS | BODY MASS INDEX: 33 KG/M2

## 2025-02-27 DIAGNOSIS — D22.9 MULTIPLE BENIGN MELANOCYTIC NEVI: ICD-10-CM

## 2025-02-27 DIAGNOSIS — L81.4 LENTIGINES: ICD-10-CM

## 2025-02-27 DIAGNOSIS — L82.1 SEBORRHEIC KERATOSIS: ICD-10-CM

## 2025-02-27 DIAGNOSIS — D18.01 CHERRY ANGIOMA: ICD-10-CM

## 2025-02-27 DIAGNOSIS — Z12.83 SKIN EXAM, SCREENING FOR CANCER: Primary | ICD-10-CM

## 2025-02-27 PROCEDURE — 99213 OFFICE O/P EST LOW 20 MIN: CPT | Performed by: DERMATOLOGY

## 2025-02-27 NOTE — PATIENT INSTRUCTIONS
Physical Exam and Assessment/Plan by Diagnosis:    - Spot on right bicep. Patient reports there may have been recent trauma to site. Appears inflammatory on exam today.   - Advised patient to contact the office or Dr. Aldana via [place] if spot does not heal within a few weeks.     SEBORRHEIC KERATOSES  - Relevant exam: Scattered over the trunk/extremities are waxy brown to black plaques and papules with stuck on appearance and consistent dermoscopy  - Exam and clinical history consistent with seborrheic keratoses  - Counseled that these are benign growths that do not require treatment    MELANOCYTIC NEVI  -Relevant exam: Scattered over the trunk/extremities are homogenously pigmented brown macules and papules. ELM performed and without concerning findings. No outliers unless otherwise noted in today's note  - Exam and clinical history consistent with melanocytic nevi  - Counseled to return to clinic prior to scheduled appointment should any of these lesions change or should any new lesions of concern arise  - Counseled on use of sun protection daily. Reviewed latest FDA sunscreen guidelines, including use of broad spectrum (UVA and UVB blocking) sunscreen or sun protective clothing with SPF 30-50 every 2-3 hours and reapplied after exposure to water    LENTIGINES  OTHER SKIN CHANGES DUE TO CHRONIC EXPOSURE TO NONIONIZING RADIATION  - Relevant exam: Over sun exposed areas are brown macules. ELM performed and without concerning findings.  - Exam and clinical history consistent with lentigines.  - Counseled to return to clinic prior to scheduled appointment should any of these lesions change or should any new lesions of concern arise.  - Recommended use of sunscreen as above and below.    CHERRY ANGIOMAS  - Relevant exam: Scattered over the trunk/extremities are red papules  - Exam and clinical history consistent with cherry angiomas  - Educated that these are benign

## 2025-02-27 NOTE — PROGRESS NOTES
"[facility] Note     Patient Name: Ra Corbett  Encounter Date: 2/27/2025     Have you been cared for by a [facility] Dermatologist in the last 3 years and, if so, which description applies to you?    Yes.  I have been here within the last 3 years, and my medical history has NOT changed since that time.  I am MALE/not capable of bearing children.    REVIEW OF SYSTEMS:  Have you recently had or currently have any of the following? No changes in my recent health.   PAST MEDICAL HISTORY:  Have you personally ever had or currently have any of the following?  If \"YES,\" then please provide more detail. No changes in my medical history.   HISTORY OF IMMUNOSUPPRESSION: Do you have a history of any of the following:  Systemic Immunosuppression such as Diabetes, Biologic or Immunotherapy, Chemotherapy, Organ Transplantation, Bone Marrow Transplantation or Prednisone?  No     Answering \"YES\" requires the addition of the dotphrase \"IMMUNOSUPPRESSED\" as the first diagnosis of the patient's visit.   FAMILY HISTORY:  Any \"first degree relatives\" (parent, brother, sister, or child) with the following?    No changes in my family's known health.   PATIENT EXPERIENCE:    Do you want the Dermatologist to perform a COMPLETE skin exam today including a clinical examination under the \"bra and underwear\" areas?  Yes  If necessary, do we have your permission to call and leave a detailed message on your Preferred Phone number that includes your specific medical information?  Yes      Allergies   Allergen Reactions    Shellfish-Derived Products - Food Allergy Swelling     Hives and severe swelling      Current Outpatient Medications:     atorvastatin (LIPITOR) 20 mg tablet, Take by mouth, Disp: , Rfl:     dicyclomine (BENTYL) 10 mg capsule, Take 10 mg by mouth, Disp: , Rfl:     fexofenadine (ALLEGRA) 180 MG tablet, Take by mouth, Disp: , Rfl:     Flaxseed, Linseed, (Flaxseed Oil) 1000 MG CAPS, Take by mouth, Disp: , " Rfl:     fluticasone (FLONASE) 50 mcg/act nasal spray, , Disp: , Rfl:     tamsulosin (FLOMAX) 0.4 mg, Take 0.4 mg by mouth daily, Disp: , Rfl:     verapamil (VERELAN) 240 MG 24 hr capsule, TAKE 1 CAPSULE BY MOUTH EVERY NIGHT, Disp: , Rfl:           Whom besides the patient is providing clinical information about today's encounter?   NO ADDITIONAL HISTORIAN (patient alone provided history)    Physical Exam and Assessment/Plan by Diagnosis:    - Spot on right bicep. Patient reports there may have been recent trauma to site. Appears inflammatory on exam today. If not resolved in a few weeks, notify me  - Advised patient to contact the office or Dr. Aldana via [place] if spot does not heal within a few weeks.     SEBORRHEIC KERATOSES  - Relevant exam: Scattered over the trunk/extremities are waxy brown to black plaques and papules with stuck on appearance and consistent dermoscopy  - Exam and clinical history consistent with seborrheic keratoses  - Counseled that these are benign growths that do not require treatment    MELANOCYTIC NEVI  -Relevant exam: Scattered over the trunk/extremities are homogenously pigmented brown macules and papules. ELM performed and without concerning findings. No outliers unless otherwise noted in today's note  - Exam and clinical history consistent with melanocytic nevi  - Counseled to return to clinic prior to scheduled appointment should any of these lesions change or should any new lesions of concern arise  - Counseled on use of sun protection daily. Reviewed latest FDA sunscreen guidelines, including use of broad spectrum (UVA and UVB blocking) sunscreen or sun protective clothing with SPF 30-50 every 2-3 hours and reapplied after exposure to water    LENTIGINES  OTHER SKIN CHANGES DUE TO CHRONIC EXPOSURE TO NONIONIZING RADIATION  - Relevant exam: Over sun exposed areas are brown macules. ELM performed and without concerning findings.  - Exam and clinical history consistent with  lentigines.  - Counseled to return to clinic prior to scheduled appointment should any of these lesions change or should any new lesions of concern arise.  - Recommended use of sunscreen as above and below.    CHERRY ANGIOMAS  - Relevant exam: Scattered over the trunk/extremities are red papules  - Exam and clinical history consistent with cherry angiomas  - Educated that these are benign    No lesions concerning for skin cancer on FBSE.    Scribe Attestation      I,:  Radhika Douglas MA am acting as a scribe while in the presence of the attending physician.:       I,:  Jil Aldana MD personally performed the services described in this documentation    as scribed in my presence.: